# Patient Record
Sex: FEMALE | Race: WHITE | Employment: OTHER | URBAN - METROPOLITAN AREA
[De-identification: names, ages, dates, MRNs, and addresses within clinical notes are randomized per-mention and may not be internally consistent; named-entity substitution may affect disease eponyms.]

---

## 2017-10-14 ENCOUNTER — OFFICE VISIT (OUTPATIENT)
Dept: URGENT CARE | Facility: RETAIL CLINIC | Age: 32
End: 2017-10-14

## 2017-10-14 VITALS — DIASTOLIC BLOOD PRESSURE: 76 MMHG | SYSTOLIC BLOOD PRESSURE: 115 MMHG | TEMPERATURE: 98.1 F | HEART RATE: 80 BPM

## 2017-10-14 DIAGNOSIS — R30.0 DYSURIA: Primary | ICD-10-CM

## 2017-10-14 DIAGNOSIS — N39.0 ACUTE LOWER UTI: ICD-10-CM

## 2017-10-14 LAB
APPEARANCE UR: ABNORMAL
BILIRUB UR QL: ABNORMAL
COLOR UR: ABNORMAL
GLUCOSE URINE: ABNORMAL MG/DL
HGB UR QL: ABNORMAL
KETONES UR QL: ABNORMAL MG/DL
LEUKOCYTE ESTERASE URINE: ABNORMAL
NITRITE UR QL STRIP: ABNORMAL
PH UR STRIP: 6.5 PH (ref 5–7)
PROTEIN ALBUMIN URINE: 100 MG/DL
SOURCE: ABNORMAL
SP GR UR STRIP: 1.02 (ref 1–1.03)
UROBILINOGEN UR QL STRIP: 1 EU/DL (ref 0.2–1)

## 2017-10-14 PROCEDURE — 99203 OFFICE O/P NEW LOW 30 MIN: CPT | Performed by: PHYSICIAN ASSISTANT

## 2017-10-14 PROCEDURE — 81002 URINALYSIS NONAUTO W/O SCOPE: CPT | Mod: QW | Performed by: PHYSICIAN ASSISTANT

## 2017-10-14 RX ORDER — NITROFURANTOIN 25; 75 MG/1; MG/1
100 CAPSULE ORAL 2 TIMES DAILY
Qty: 10 CAPSULE | Refills: 0 | Status: SHIPPED | OUTPATIENT
Start: 2017-10-14 | End: 2017-11-22

## 2017-10-14 NOTE — PATIENT INSTRUCTIONS
Take full course of antibiotics.  May use over the counter Azo or Urostat for urinary burning, but do not use before future urine tests.  Limit caffeine and alcohol as these are bladder irritants.  Drink plenty of fluids.  May take tylenol or ibuprofen as needed for discomfort.   If you develop any vomiting, high fevers or severe back pain, these can be signs of a kidney infection and you should be seen in urgent care or in the ER.  If your symptoms continue after finishing antibiotics or begin again shortly after finishing the antibiotics, please see your primary care provider or go to urgent care. Further testing may be required that is not available at [facility].  Please follow up with primary care provider if not improving, worsening or new symptoms or for any adverse reactions to medications.        **Please follow up with your primary care provider for repeat urine test in 1-2 weeks due to blood and protein being your urine today

## 2017-10-14 NOTE — PROGRESS NOTES
Chief Complaint   Patient presents with     Dysuria     pain after urninating x 3-4 days,  and blood in urine this am, no fevers      SUBJECTIVE:  Shyla Barrett is a 32 year old female who  presents today for a possible UTI. Symptoms of dysuria and frequency have been going on for 3 day(s).  Hematuria yes - today.  gradual onset and worsening and moderate.  There is no history of fever, chills, nausea or vomiting.  No history of vaginal discharge. This patient does  have a history of urinary tract infections - last one 4 yrs ago. Patient denies long duration, flank pain and temperature > 101 degrees F. or vaginal discharge. LMP was 3.5 weeks ago.     Past Medical History:   Diagnosis Date     Back pain      No current outpatient prescriptions on file.        Allergies   Allergen Reactions     Bactrim [Sulfamethoxazole W-Trimethoprim] Rash        Social History   Substance Use Topics     Smoking status: Former Smoker     Packs/day: 0.50     Years: 2.00     Smokeless tobacco: Never Used     Alcohol use Yes      Comment: 2 drinks/month       ROS:   CONSTITUTIONAL:NEGATIVE for fever, chills  GI: NEGATIVE for nausea, vomiting, abdominal pain  : dysuria and frequency; hematuria this AM    OBJECTIVE:  /76 (BP Location: Left arm)  Pulse 80  Temp 98.1  F (36.7  C) (Temporal)  LMP 09/18/2017 (Within Days)  Breastfeeding? No  GENERAL APPEARANCE: healthy, alert and no distress  RESP: lungs clear to auscultation - no rales, rhonchi or wheezes  CV: regular rates and rhythm, normal S1 S2, no murmur noted  ABDOMEN:  soft, nontender, bowel sounds normal  BACK: No CVA tenderness  SKIN: no suspicious lesions or rashes    Urine dipstick small leuks, positive nitrites, mod blood, 100 protein  Strongly advised to send out urine culture, but patient declined due to self pay cost. Discussed the risk of this, she still wanted to proceed with not sending out culture.    ASSESSMENT:   (R30.0) Dysuria  (primary encounter  diagnosis)  (N39.0) Acute lower UTI    PLAN:   nitroFURantoin, macrocrystal-monohydrate, (MACROBID) 100 MG capsule  Take full course of antibiotics.  May use over the counter Azo or Urostat for urinary burning, but do not use before future urine tests.  Limit caffeine and alcohol as these are bladder irritants.  Drink plenty of fluids.  May take tylenol or ibuprofen as needed for discomfort.   If you develop any vomiting, high fevers or severe back pain, these can be signs of a kidney infection and you should be seen in urgent care or in the ER.  If your symptoms continue after finishing antibiotics or begin again shortly after finishing the antibiotics, please see your primary care provider or go to urgent care. Further testing may be required that is not available at [facility].  Please follow up with primary care provider if not improving, worsening or new symptoms or for any adverse reactions to medications.    **Please follow up with your primary care provider for repeat urine test in 1-2 weeks due to blood and protein being your urine today     Rochelle Kirk PA-C  [facility]

## 2017-10-14 NOTE — NURSING NOTE
"Chief Complaint   Patient presents with     Dysuria     pain after urninating x 3-4 days,  and blood in urine this am, no fevers       Initial /76 (BP Location: Left arm)  Pulse 80  Temp 98.1  F (36.7  C) (Temporal)  LMP 09/18/2017 (Within Days)  Breastfeeding? No Estimated body mass index is 20.43 kg/(m^2) as calculated from the following:    Height as of 7/3/14: 5' 1.81\" (1.57 m).    Weight as of 7/3/14: 111 lb (50.3 kg).  Medication Reconciliation: complete    "

## 2017-10-14 NOTE — MR AVS SNAPSHOT
"              After Visit Summary   10/14/2017    Shyla Barrett    MRN: [id number]           Patient Information     Date Of Birth          1985        Visit Information        Provider Department      10/14/2017 11:40 AM Rochelle Kirk PA-C [facility]        Today's Diagnoses     Dysuria    -  1    Acute lower UTI          Care Instructions    Take full course of antibiotics.  May use over the counter Azo or Urostat for urinary burning, but do not use before future urine tests.  Limit caffeine and alcohol as these are bladder irritants.  Drink plenty of fluids.  May take tylenol or ibuprofen as needed for discomfort.   If you develop any vomiting, high fevers or severe back pain, these can be signs of a kidney infection and you should be seen in urgent care or in the ER.  If your symptoms continue after finishing antibiotics or begin again shortly after finishing the antibiotics, please see your primary care provider or go to urgent care. Further testing may be required that is not available at [facility].  Please follow up with primary care provider if not improving, worsening or new symptoms or for any adverse reactions to medications.        **Please follow up with your primary care provider for repeat urine test in 1-2 weeks due to blood and protein being your urine today                 Follow-ups after your visit        Who to contact     You can reach your care team any time of the day by calling [phone].  Notification of test results:  If you have an abnormal lab result, we will notify you by phone as soon as possible.         Additional Information About Your Visit        MyChart Information     [employer] lets you send messages to your doctor, view your test results, renew your prescriptions, schedule appointments and more. To sign up, go to [email or web address] . Click on \"Log in\" on the left side of the screen, which will take you to the Welcome page. Then click " "on \"Sign up Now\" on the right side of the page.     You will be asked to enter the access code listed below, as well as some personal information. Please follow the directions to create your username and password.     Your access code is: -[email or web address]  Expires: 2018 12:11 PM     Your access code will  in 90 days. If you need help or a new code, please call your [place] clinic or [phone].        Care EveryWhere ID     This is your Care EveryWhere ID. This could be used by other organizations to access your [place] medical records  [id number]        Your Vitals Were     Pulse Temperature Last Period Breastfeeding?          80 98.1  F (36.7  C) (Temporal) 2017 (Within Days) No         Blood Pressure from Last 3 Encounters:   10/14/17 115/76   14 (!) 88/62   13 100/62    Weight from Last 3 Encounters:   14 111 lb (50.3 kg)   13 118 lb (53.5 kg)   13 117 lb (53.1 kg)              We Performed the Following     HCL U/A, W/O MICRO, NON AUTO          Today's Medication Changes          These changes are accurate as of: 10/14/17 12:11 PM.  If you have any questions, ask your nurse or doctor.               Start taking these medicines.        Dose/Directions    nitroFURantoin (macrocrystal-monohydrate) 100 MG capsule   Commonly known as:  MACROBID   Used for:  Acute lower UTI        Dose:  100 mg   Take 1 capsule (100 mg) by mouth 2 times daily for 5 days   Quantity:  10 capsule   Refills:  0            Where to get your medicines      These medications were sent to [facility] - [place], [place] - [address] [address]  [address] [address] [address]     Phone:  [phone]     nitroFURantoin (macrocrystal-monohydrate) 100 MG capsule                Primary Care Provider Office Phone # Fax #    GIOVANI Wallace [phone]       XXX RESIGNED XXX [address] [facility] [address]        Equal Access to Services     BECKY KWONG " AH: Mike burdick Farzaneh, waavada luqadaha, qaybta kathu etiennecarmenliliadaly stacey acmeronramon villatoroportialynette darnell. So [facility] [phone].    ATENCIÓN: Si habla español, tiene a weinstein disposición servicios gratuitos de asistencia lingüística. Llame al [phone].    We comply with applicable federal civil rights laws and [place] laws. We do not discriminate on the basis of race, color, national origin, age, disability, sex, sexual orientation, or gender identity.            Thank you!     Thank you for choosing [facility]  for your care. Our goal is always to provide you with excellent care. Hearing back from our patients is one way we can continue to improve our services. Please take a few minutes to complete the written survey that you may receive in the mail after your visit with us. Thank you!             Your Updated Medication List - Protect others around you: Learn how to safely use, store and throw away your medicines at www.disposemymeds.org.          This list is accurate as of: 10/14/17 12:11 PM.  Always use your most recent med list.                   Brand Name Dispense Instructions for use Diagnosis    nitroFURantoin (macrocrystal-monohydrate) 100 MG capsule    MACROBID    10 capsule    Take 1 capsule (100 mg) by mouth 2 times daily for 5 days    Acute lower UTI

## 2017-11-22 ENCOUNTER — OFFICE VISIT (OUTPATIENT)
Dept: FAMILY MEDICINE | Facility: OTHER | Age: 32
End: 2017-11-22
Payer: MEDICAID

## 2017-11-22 VITALS
DIASTOLIC BLOOD PRESSURE: 76 MMHG | WEIGHT: 118 LBS | HEIGHT: 62 IN | SYSTOLIC BLOOD PRESSURE: 108 MMHG | RESPIRATION RATE: 16 BRPM | TEMPERATURE: 98.9 F | HEART RATE: 92 BPM | BODY MASS INDEX: 21.71 KG/M2

## 2017-11-22 DIAGNOSIS — N39.0 ACUTE LOWER UTI: Primary | ICD-10-CM

## 2017-11-22 DIAGNOSIS — R82.90 NONSPECIFIC FINDING ON EXAMINATION OF URINE: ICD-10-CM

## 2017-11-22 DIAGNOSIS — R30.0 DYSURIA: ICD-10-CM

## 2017-11-22 LAB
ALBUMIN UR-MCNC: NEGATIVE MG/DL
APPEARANCE UR: CLEAR
BACTERIA #/AREA URNS HPF: ABNORMAL /HPF
BILIRUB UR QL STRIP: NEGATIVE
COLOR UR AUTO: YELLOW
GLUCOSE UR STRIP-MCNC: NEGATIVE MG/DL
HGB UR QL STRIP: ABNORMAL
KETONES UR STRIP-MCNC: NEGATIVE MG/DL
LEUKOCYTE ESTERASE UR QL STRIP: NEGATIVE
NITRATE UR QL: POSITIVE
NON-SQ EPI CELLS #/AREA URNS LPF: ABNORMAL /LPF
PH UR STRIP: 7 PH (ref 5–7)
RBC #/AREA URNS AUTO: ABNORMAL /HPF
SOURCE: ABNORMAL
SP GR UR STRIP: 1.02 (ref 1–1.03)
SPECIMEN SOURCE: NORMAL
UROBILINOGEN UR STRIP-ACNC: 0.2 EU/DL (ref 0.2–1)
WBC #/AREA URNS AUTO: ABNORMAL /HPF
WET PREP SPEC: NORMAL

## 2017-11-22 PROCEDURE — 81001 URINALYSIS AUTO W/SCOPE: CPT | Performed by: FAMILY MEDICINE

## 2017-11-22 PROCEDURE — 87086 URINE CULTURE/COLONY COUNT: CPT | Performed by: FAMILY MEDICINE

## 2017-11-22 PROCEDURE — 87210 SMEAR WET MOUNT SALINE/INK: CPT | Performed by: FAMILY MEDICINE

## 2017-11-22 PROCEDURE — 99214 OFFICE O/P EST MOD 30 MIN: CPT | Performed by: FAMILY MEDICINE

## 2017-11-22 RX ORDER — NITROFURANTOIN 25; 75 MG/1; MG/1
100 CAPSULE ORAL 2 TIMES DAILY
Qty: 10 CAPSULE | Refills: 0 | Status: SHIPPED | OUTPATIENT
Start: 2017-11-22 | End: 2018-01-29

## 2017-11-22 ASSESSMENT — PAIN SCALES - GENERAL: PAINLEVEL: MILD PAIN (3)

## 2017-11-22 NOTE — NURSING NOTE
"Chief Complaint   Patient presents with     UTI     Panel Management     lipids, pap, flu shot, tdap       Initial /76 (BP Location: Right arm, Patient Position: Chair, Cuff Size: Adult Regular)  Pulse 92  Temp 98.9  F (37.2  C) (Temporal)  Resp 16  Ht 5' 2\" (1.575 m)  Wt 118 lb (53.5 kg)  LMP 10/25/2017 (Approximate)  BMI 21.58 kg/m2 Estimated body mass index is 21.58 kg/(m^2) as calculated from the following:    Height as of this encounter: 5' 2\" (1.575 m).    Weight as of this encounter: 118 lb (53.5 kg).  Medication Reconciliation: complete  Raji Quiroz, [employer]    "

## 2017-11-22 NOTE — PROGRESS NOTES
"  SUBJECTIVE:                                                    Shyla Barrett is a 32 year old female who presents to clinic today for the following health issues:      HPI    URINARY TRACT SYMPTOMS  Onset: week    Description:   Painful urination (Dysuria): YES- slightly  Blood in urine (Hematuria): no   Delay in urine (Hesitency): YES- sometimes    Intensity: mild    Progression of Symptoms:  same    Accompanying Signs & Symptoms:  Fever/chills: no   Flank pain no   Nausea and vomiting: no   Any vaginal symptoms: vaginal discharge  Abdominal/Pelvic Pain: no     History:   History of frequent UTI's: YES  History of kidney stones: no   Sexually Active: YES  Possibility of pregnancy: Don't Know    Precipitating factors:   none    Therapies Tried and outcome: none    Pt has had about 1 week of UTI symptoms.  Just got over a UTI a few weeks ago.  Used to get frequent UTIs.  Her last UTI was diagnosed at [facility].  No culture performed.  Treated with Macrobid for 5 days.      Recently found out that her  has been sleeping with someone else.        Problem list and histories reviewed & adjusted, as indicated.  Additional history: as documented      No current outpatient prescriptions on file.     No lab results found.   BP Readings from Last 3 Encounters:   11/22/17 108/76   10/14/17 115/76   07/03/14 (!) 88/62    Wt Readings from Last 3 Encounters:   11/22/17 118 lb (53.5 kg)   07/03/14 111 lb (50.3 kg)   12/06/13 118 lb (53.5 kg)                 ROS:  Constitutional, HEENT, cardiovascular, pulmonary, gi and gu systems are negative, except as otherwise noted.  See above.        OBJECTIVE:   /76 (BP Location: Right arm, Patient Position: Chair, Cuff Size: Adult Regular)  Pulse 92  Temp 98.9  F (37.2  C) (Temporal)  Resp 16  Ht 5' 2\" (1.575 m)  Wt 118 lb (53.5 kg)  LMP 10/25/2017 (Approximate)  BMI 21.58 kg/m2  Body mass index is 21.58 kg/(m^2).  GENERAL: healthy, alert and no distress  NECK: no " adenopathy, no asymmetry, masses, or scars and thyroid normal to palpation  RESP: lungs clear to auscultation - no rales, rhonchi or wheezes  CV: regular rate and rhythm, normal S1 S2, no S3 or S4, no murmur, click or rub, no peripheral edema and peripheral pulses strong  ABDOMEN: soft, nontender, no hepatosplenomegaly, no masses and bowel sounds normal  MS: no gross musculoskeletal defects noted, no edema    Diagnostic Test Results:  Results for orders placed or performed in visit on 11/22/17   UA reflex to Microscopic and Culture   Result Value Ref Range    Color Urine Yellow     Appearance Urine Clear     Glucose Urine Negative NEG^Negative mg/dL    Bilirubin Urine Negative NEG^Negative    Ketones Urine Negative NEG^Negative mg/dL    Specific Gravity Urine 1.020 1.003 - 1.035    Blood Urine Trace (A) NEG^Negative    pH Urine 7.0 5.0 - 7.0 pH    Protein Albumin Urine Negative NEG^Negative mg/dL    Urobilinogen Urine 0.2 0.2 - 1.0 EU/dL    Nitrite Urine Positive (A) NEG^Negative    Leukocyte Esterase Urine Negative NEG^Negative    Source Unspecified Urine    Urine Microscopic   Result Value Ref Range    WBC Urine O - 2 OTO2^O - 2 /HPF    RBC Urine O - 2 OTO2^O - 2 /HPF    Squamous Epithelial /LPF Urine Many (A) FEW^Few /LPF    Bacteria Urine Few (A) NEG^Negative /HPF   Wet prep   Result Value Ref Range    Specimen Description Vagina     Wet Prep No Trichomonas seen     Wet Prep No clue cells seen     Wet Prep No yeast seen        ASSESSMENT/PLAN:       ICD-10-CM    1. Acute lower UTI N39.0 nitroFURantoin, macrocrystal-monohydrate, (MACROBID) 100 MG capsule     Urine Microscopic   2. Dysuria R30.0 UA reflex to Microscopic and Culture     Wet prep   3. Nonspecific finding on examination of urine R82.90 Urine Culture Aerobic Bacterial     Appears c/w UTI given positive nitrite.  Will treat with antibiotics and await culture results.  If symptoms aren't improving or culture doesn't indicate infection, would recommend  testing for STDs given her 's recent cheating.  Would offer counseling referral as well.                Patient Instructions   Thank you for visiting [facility]    Take the antibiotics until they're gone.    We'll let you know culture results ASAP.    Contact us or return if questions or concerns.     Please make an appointment with your either myself or your provider  in 1-2 weeks for follow up if needed.       If you had imaging scheduled please refer to your radiology prep sheet.    Appointment    Date_______________     Time_____________    Day:   M TU W TH F    With____________________________    Location_________________________    If you need medication refills, please contact your pharmacy 3 days before your prescriptions runs out. If you are out of refills, your pharmacy will contact contact the clinic.    Contact us or return if questions or concerns.     -Your Care Team:  MD Debroah Mcclelland PA-C Joel De Haan, PA-C Elizabeth McLean, APRN CNP    General information about your clinic      Clinic hours:     Lab hours:  Phone [phone]  Monday 7:30 am-7 pm    Monday 8:30 am-6:30 pm  Tuesday-Friday 7:30 am-5 pm   Tuesday-Friday 8:30 am-4:30 pm    Pharmacy hours:  Phone [phone]  Monday 8:30 am-7pm  Tuesday-Friday 8:30am-6 pm                                       Mychart assistance [phone]        We would like to hear from you, how was your visit today?    Courtney Phan  Patient Information Supervisor   Patient Care Supervisor  [facility], and [facility], and [facility]  [phone]         Luis Eduardo Johnson MD, MD  [facility]

## 2017-11-22 NOTE — MR AVS SNAPSHOT
After Visit Summary   11/22/2017    Shyla Barrett    MRN: [id number]           Patient Information     Date Of Birth          1985        Visit Information        Provider Department      11/22/2017 8:30 AM Luis Eduardo Johnson MD [facility]        Today's Diagnoses     Acute lower UTI    -  1    Dysuria          Care Instructions    Thank you for visiting [facility]    Take the antibiotics until they're gone.    We'll let you know culture results ASAP.    Contact us or return if questions or concerns.     Please make an appointment with your either myself or your provider  in 1-2 weeks for follow up if needed.       If you had imaging scheduled please refer to your radiology prep sheet.    Appointment    Date_______________     Time_____________    Day:   M TU W TH F    With____________________________    Location_________________________    If you need medication refills, please contact your pharmacy 3 days before your prescriptions runs out. If you are out of refills, your pharmacy will contact contact the clinic.    Contact us or return if questions or concerns.     -Your Care Team:  MD Deborah Mcclelland PA-C Joel De Haan, PA-C Elizabeth McLean, APRN CNP    General information about your clinic      Clinic hours:     Lab hours:  Phone [phone]  Monday 7:30 am-7 pm    Monday 8:30 am-6:30 pm  Tuesday-Friday 7:30 am-5 pm   Tuesday-Friday 8:30 am-4:30 pm    Pharmacy hours:  Phone [phone]  Monday 8:30 am-7pm  Tuesday-Friday 8:30am-6 pm                                       Mychart assistance [phone]        We would like to hear from you, how was your visit today?    Courtney Phan  Patient Information Supervisor   Patient Care Supervisor  [facility], and \[facility] [place] [facility]\"", and [facility]  [phone]             Follow-ups after your visit        Who to  "contact     If you have questions or need follow up information about today's clinic visit or your schedule please contact [facility] directly at [phone].  Normal or non-critical lab and imaging results will be communicated to you by MyChart, letter or phone within 4 business days after the clinic has received the results. If you do not hear from us within 7 days, please contact the clinic through [employer] or phone. If you have a critical or abnormal lab result, we will notify you by phone as soon as possible.  Submit refill requests through [email or web address] or call your pharmacy and they will forward the refill request to us. Please allow 3 business days for your refill to be completed.          Additional Information About Your Visit        [email or web address] Information     [email or web address] lets you send messages to your doctor, view your test results, renew your prescriptions, schedule appointments and more. To sign up, go to [email or web address] . Click on \"Log in\" on the left side of the screen, which will take you to the Welcome page. Then click on \"Sign up Now\" on the right side of the page.     You will be asked to enter the access code listed below, as well as some personal information. Please follow the directions to create your username and password.     Your access code is: -[email or web address]  Expires: 2018 11:11 AM     Your access code will  in 90 days. If you need help or a new code, please call your [place] clinic or [phone].        Care EveryWhere ID     This is your Care EveryWhere ID. This could be used by other organizations to access your [place] medical records  [id number]        Your Vitals Were     Pulse Temperature Respirations Height Last Period BMI (Body Mass Index)    92 98.9  F (37.2  C) (Temporal) 16 5' 2\" (1.575 m) 10/25/2017 (Approximate) 21.58 kg/m2       Blood Pressure from Last 3 Encounters:   17 108/76   10/14/17 115/76   14 (!) 88/62    Weight from Last 3 " Encounters:   11/22/17 118 lb (53.5 kg)   07/03/14 111 lb (50.3 kg)   12/06/13 118 lb (53.5 kg)              We Performed the Following     UA reflex to Microscopic and Culture     Urine Microscopic     Wet prep          Today's Medication Changes          These changes are accurate as of: 11/22/17  9:03 AM.  If you have any questions, ask your nurse or doctor.               Start taking these medicines.        Dose/Directions    nitroFURantoin (macrocrystal-monohydrate) 100 MG capsule   Commonly known as:  MACROBID   Used for:  Acute lower UTI   Started by:  Luis Eduardo Johnson MD        Dose:  100 mg   Take 1 capsule (100 mg) by mouth 2 times daily   Quantity:  10 capsule   Refills:  0            Where to get your medicines      These medications were sent to [facility] [place] - [address]   [address] [place] [address]     Phone:  [phone]     nitroFURantoin (macrocrystal-monohydrate) 100 MG capsule                Primary Care Provider Office Phone # Fax #    Flaca Leggett Marie, APRN [phone]       XXX RESIGNED XXX [address]  [facility] [address]        Equal Access to Services     BECKY KWONG AH: Hadii saman torrez hadasho Soomaali, waaxda luqadaha, qaybta kaalmada adeegyada, alf ibarra haynida darnell. So [facility] [phone].    ATENCIÓN: Si habla español, tiene a weinstein disposición servicios gratuitos de asistencia lingüística. Llame al [phone].    We comply with applicable federal civil rights laws and [place] laws. We do not discriminate on the basis of race, color, national origin, age, disability, sex, sexual orientation, or gender identity.            Thank you!     Thank you for choosing [facility]  for your care. Our goal is always to provide you with excellent care. Hearing back from our patients is one way we can continue to improve our services. Please take a few minutes to complete the written  survey that you may receive in the mail after your visit with us. Thank you!             Your Updated Medication List - Protect others around you: Learn how to safely use, store and throw away your medicines at www.disposemymeds.org.          This list is accurate as of: 11/22/17  9:03 AM.  Always use your most recent med list.                   Brand Name Dispense Instructions for use Diagnosis    nitroFURantoin (macrocrystal-monohydrate) 100 MG capsule    MACROBID    10 capsule    Take 1 capsule (100 mg) by mouth 2 times daily    Acute lower UTI

## 2017-11-22 NOTE — PATIENT INSTRUCTIONS
Thank you for visiting [facility]    Take the antibiotics until they're gone.    We'll let you know culture results ASAP.    Contact us or return if questions or concerns.     Please make an appointment with your either myself or your provider  in 1-2 weeks for follow up if needed.       If you had imaging scheduled please refer to your radiology prep sheet.    Appointment    Date_______________     Time_____________    Day:   M TU W TH F    With____________________________    Location_________________________    If you need medication refills, please contact your pharmacy 3 days before your prescriptions runs out. If you are out of refills, your pharmacy will contact contact the clinic.    Contact us or return if questions or concerns.     -Your Care Team:  MD Deborah Mcclelland PA-C Joel De Haan, PA-C Elizabeth McLean, APRN CNP    General information about your clinic      Clinic hours:     Lab hours:  Phone [phone]  Monday 7:30 am-7 pm    Monday 8:30 am-6:30 pm  Tuesday-Friday 7:30 am-5 pm   Tuesday-Friday 8:30 am-4:30 pm    Pharmacy hours:  Phone [phone]  Monday 8:30 am-7pm  Tuesday-Friday 8:30am-6 pm                                       Mychart assistance [phone]        We would like to hear from you, how was your visit today?    Courtney Phan  Patient Information Supervisor   Patient Care Supervisor  [facility], and [facility], and [facility]  [phone]

## 2017-11-23 LAB
BACTERIA SPEC CULT: NORMAL
SPECIMEN SOURCE: NORMAL

## 2017-11-24 NOTE — PROGRESS NOTES
Your urine culture didn't indicate infection.  If your symptoms aren't resolving after your antibiotics, then I'd recommend we do testing for other vaginal infections (genprobe).    Thanks,    Dr. Johnson

## 2018-01-26 NOTE — PROGRESS NOTES
SUBJECTIVE:   Shyla Barrett is a 32 year old female who presents to clinic today for the following health issues:      HPI    Patient here today for STD screening.  Patient stated she found our her  was sleeping with someone else.  Patient states she does not have any symptoms currently, but was having some irregular bleeding.  She also reports increased spotting over the past 2 months.  She was previously diagnosed with hypothyroidism and has been out of medications for over a year due to lack of insurance.    Problem list and histories reviewed & adjusted, as indicated.  Additional history: as documented        Patient Active Problem List   Diagnosis     Low back pain     Ankle pain     Headache     Dysmenorrhea     Acquired hypothyroidism     Screen for STD (sexually transmitted disease)     Past Surgical History:   Procedure Laterality Date     ABDOMEN SURGERY       BREAST SURGERY       C/SECTION, CLASSICAL      x 2     COSMETIC SURGERY         Social History   Substance Use Topics     Smoking status: Current Some Day Smoker     Packs/day: 0.50     Years: 2.00     Smokeless tobacco: Never Used     Alcohol use Yes      Comment: 2 drinks/month     Family History   Problem Relation Age of Onset     Cancer - colorectal Maternal Grandfather          No current outpatient prescriptions on file.     Allergies   Allergen Reactions     Bactrim [Sulfamethoxazole W-Trimethoprim] Rash     BP Readings from Last 3 Encounters:   01/29/18 104/66   11/22/17 108/76   10/14/17 115/76    Wt Readings from Last 3 Encounters:   01/29/18 117 lb (53.1 kg)   11/22/17 118 lb (53.5 kg)   07/03/14 111 lb (50.3 kg)                  Labs reviewed in EPIC    ROS:  Constitutional, HEENT, cardiovascular, pulmonary, GI, , musculoskeletal, neuro, skin, endocrine and psych systems are negative, except as otherwise noted.    OBJECTIVE:     /66 (BP Location: Right arm, Patient Position: Chair, Cuff Size: Adult Regular)  Pulse 98   "Temp 97.9  F (36.6  C) (Oral)  Resp 16  Ht 5' 2\" (1.575 m)  Wt 117 lb (53.1 kg)  SpO2 100%  BMI 21.4 kg/m2  Body mass index is 21.4 kg/(m^2).   Physical Exam   Constitutional: She is oriented to person, place, and time. She appears well-developed and well-nourished.   HENT:   Head: Normocephalic and atraumatic.   Right Ear: External ear normal.   Left Ear: External ear normal.   Mouth/Throat: Oropharynx is clear and moist.   Eyes: EOM are normal.   Neck: Neck supple.   Cardiovascular: Normal rate, regular rhythm, normal heart sounds and intact distal pulses.    Pulmonary/Chest: Effort normal and breath sounds normal. No respiratory distress. She has no wheezes. She has no rales. She exhibits no tenderness.   Abdominal: Soft. Bowel sounds are normal.   Genitourinary:   Genitourinary Comments: She declined vaginal exam today.   Neurological: She is alert and oriented to person, place, and time.   Psychiatric: She has a normal mood and affect. Her behavior is normal. Judgment and thought content normal.         Diagnostic Test Results:  none     ASSESSMENT/PLAN:     Problem List Items Addressed This Visit        SPECIALTY PROBLEM LIST    Dysmenorrhea - Primary     Rule out pregnancy.  Likely secondary to not being on her thyroid medications.  Get TSH levels and complete blood picture.  Will restart medications based on results.           Relevant Orders    TSH with free T4 reflex (Completed)    CBC with platelets (Completed)       Other    Screen for STD (sexually transmitted disease)     Patient found out that her  was sleeping with someone else and [facility] would like to have STD screening donee   Denies any current symptoms.  Labs as ordered.  Follow results and treat accordingly.         Relevant Orders    NEISSERIA GONORRHOEA PCR (Completed)    CHLAMYDIA TRACHOMATIS PCR (Completed)    Wet prep (Completed)    *UA reflex to Microscopic (Completed)    HIV Antigen Antibody Combo (Completed)    Urine " Microscopic (Completed)         Lluvia Cardona MD  [facility]

## 2018-01-29 ENCOUNTER — OFFICE VISIT (OUTPATIENT)
Dept: FAMILY MEDICINE | Facility: OTHER | Age: 33
End: 2018-01-29
Payer: MEDICAID

## 2018-01-29 VITALS
SYSTOLIC BLOOD PRESSURE: 104 MMHG | RESPIRATION RATE: 16 BRPM | BODY MASS INDEX: 21.53 KG/M2 | DIASTOLIC BLOOD PRESSURE: 66 MMHG | TEMPERATURE: 97.9 F | HEIGHT: 62 IN | OXYGEN SATURATION: 100 % | HEART RATE: 98 BPM | WEIGHT: 117 LBS

## 2018-01-29 DIAGNOSIS — N94.6 DYSMENORRHEA: Primary | ICD-10-CM

## 2018-01-29 DIAGNOSIS — Z11.3 SCREEN FOR STD (SEXUALLY TRANSMITTED DISEASE): ICD-10-CM

## 2018-01-29 PROBLEM — E03.9 ACQUIRED HYPOTHYROIDISM: Status: ACTIVE | Noted: 2018-01-29

## 2018-01-29 LAB
ALBUMIN UR-MCNC: NEGATIVE MG/DL
APPEARANCE UR: ABNORMAL
BACTERIA #/AREA URNS HPF: ABNORMAL /HPF
BILIRUB UR QL STRIP: NEGATIVE
CHOLEST SERPL-MCNC: 134 MG/DL
COLOR UR AUTO: ABNORMAL
ERYTHROCYTE [DISTWIDTH] IN BLOOD BY AUTOMATED COUNT: 12.4 % (ref 10–15)
GLUCOSE UR STRIP-MCNC: NEGATIVE MG/DL
HCT VFR BLD AUTO: 41 % (ref 35–47)
HDLC SERPL-MCNC: 56 MG/DL
HGB BLD-MCNC: 14.2 G/DL (ref 11.7–15.7)
HGB UR QL STRIP: ABNORMAL
KETONES UR STRIP-MCNC: NEGATIVE MG/DL
LDLC SERPL CALC-MCNC: 34 MG/DL
LEUKOCYTE ESTERASE UR QL STRIP: NEGATIVE
MCH RBC QN AUTO: 34.8 PG (ref 26.5–33)
MCHC RBC AUTO-ENTMCNC: 34.6 G/DL (ref 31.5–36.5)
MCV RBC AUTO: 101 FL (ref 78–100)
MUCOUS THREADS #/AREA URNS LPF: PRESENT /LPF
NITRATE UR QL: NEGATIVE
NON-SQ EPI CELLS #/AREA URNS LPF: ABNORMAL /LPF
NONHDLC SERPL-MCNC: 78 MG/DL
PH UR STRIP: 6.5 PH (ref 5–7)
PLATELET # BLD AUTO: 297 10E9/L (ref 150–450)
RBC # BLD AUTO: 4.08 10E12/L (ref 3.8–5.2)
RBC #/AREA URNS AUTO: ABNORMAL /HPF
SOURCE: ABNORMAL
SP GR UR STRIP: 1.02 (ref 1–1.03)
SPECIMEN SOURCE: ABNORMAL
T4 FREE SERPL-MCNC: 0.7 NG/DL (ref 0.76–1.46)
TRIGL SERPL-MCNC: 220 MG/DL
TSH SERPL DL<=0.005 MIU/L-ACNC: 27.16 MU/L (ref 0.4–4)
UROBILINOGEN UR STRIP-ACNC: 0.2 EU/DL (ref 0.2–1)
WBC # BLD AUTO: 6.1 10E9/L (ref 4–11)
WBC #/AREA URNS AUTO: ABNORMAL /HPF
WET PREP SPEC: ABNORMAL

## 2018-01-29 PROCEDURE — 87591 N.GONORRHOEAE DNA AMP PROB: CPT | Performed by: FAMILY MEDICINE

## 2018-01-29 PROCEDURE — 81001 URINALYSIS AUTO W/SCOPE: CPT | Performed by: FAMILY MEDICINE

## 2018-01-29 PROCEDURE — 87389 HIV-1 AG W/HIV-1&-2 AB AG IA: CPT | Performed by: FAMILY MEDICINE

## 2018-01-29 PROCEDURE — 84443 ASSAY THYROID STIM HORMONE: CPT | Performed by: FAMILY MEDICINE

## 2018-01-29 PROCEDURE — 99214 OFFICE O/P EST MOD 30 MIN: CPT | Performed by: FAMILY MEDICINE

## 2018-01-29 PROCEDURE — 36415 COLL VENOUS BLD VENIPUNCTURE: CPT | Performed by: FAMILY MEDICINE

## 2018-01-29 PROCEDURE — 84439 ASSAY OF FREE THYROXINE: CPT | Performed by: FAMILY MEDICINE

## 2018-01-29 PROCEDURE — 87491 CHLMYD TRACH DNA AMP PROBE: CPT | Performed by: FAMILY MEDICINE

## 2018-01-29 PROCEDURE — 80061 LIPID PANEL: CPT | Performed by: FAMILY MEDICINE

## 2018-01-29 PROCEDURE — 87210 SMEAR WET MOUNT SALINE/INK: CPT | Performed by: FAMILY MEDICINE

## 2018-01-29 PROCEDURE — 85027 COMPLETE CBC AUTOMATED: CPT | Performed by: FAMILY MEDICINE

## 2018-01-29 ASSESSMENT — PAIN SCALES - GENERAL: PAINLEVEL: NO PAIN (0)

## 2018-01-29 NOTE — ASSESSMENT & PLAN NOTE
Patient found out that her  was sleeping with someone else and [facility] would like to have STD screening donee   Denies any current symptoms.  Labs as ordered.  Follow results and treat accordingly.

## 2018-01-29 NOTE — MR AVS SNAPSHOT
"              After Visit Summary   1/29/2018    Shyla Barrett    MRN: [id number]           Patient Information     Date Of Birth          1985        Visit Information        Provider Department      1/29/2018 11:20 AM Lluvia Cardona MD [facility]        Today's Diagnoses     Screen for STD (sexually transmitted disease)    -  1    Need for prophylactic vaccination and inoculation against influenza        Need for prophylactic vaccination with tetanus-diphtheria (TD)        Dysmenorrhea           Follow-ups after your visit        Follow-up notes from your care team     Return if symptoms worsen or fail to improve.      Who to contact     If you have questions or need follow up information about today's clinic visit or your schedule please contact [facility] directly at [phone].  Normal or non-critical lab and imaging results will be communicated to you by Wi3hart, letter or phone within 4 business days after the clinic has received the results. If you do not hear from us within 7 days, please contact the clinic through MyChart or phone. If you have a critical or abnormal lab result, we will notify you by phone as soon as possible.  Submit refill requests through Similar Pages or call your pharmacy and they will forward the refill request to us. Please allow 3 business days for your refill to be completed.          Additional Information About Your Visit        MyCharClipik Information     Similar Pages lets you send messages to your doctor, view your test results, renew your prescriptions, schedule appointments and more. To sign up, go to [email or web address] . Click on \"Log in\" on the left side of the screen, which will take you to the Welcome page. Then click on \"Sign up Now\" on the right side of the page.     You will be asked to enter the access code listed below, as well as some personal information. Please follow the directions to create your username and password.     Your " "access code is: [email or web address]  Expires: 2018 11:43 AM     Your access code will  in 90 days. If you need help or a new code, please call your [place] clinic or [phone].        Care EveryWhere ID     This is your Care EveryWhere ID. This could be used by other organizations to access your [place] medical records  [id number]        Your Vitals Were     Pulse Temperature Respirations Height Pulse Oximetry BMI (Body Mass Index)    98 97.9  F (36.6  C) (Oral) 16 5' 2\" (1.575 m) 100% 21.4 kg/m2       Blood Pressure from Last 3 Encounters:   18 104/66   17 108/76   10/14/17 115/76    Weight from Last 3 Encounters:   18 117 lb (53.1 kg)   17 118 lb (53.5 kg)   14 111 lb (50.3 kg)              We Performed the Following     *UA reflex to Microscopic     CBC with platelets     CHLAMYDIA TRACHOMATIS PCR     HIV Antigen Antibody Combo     Lipid panel reflex to direct LDL Fasting     NEISSERIA GONORRHOEA PCR     TSH with free T4 reflex     Wet prep        Primary Care Provider Office Phone # Fax #    GIOVANI Wallace [phone]       XXX RESIGNED XXX [address] [facility] [address]        Equal Access to Services     BECKY KWONG AH: Hadii saman sahuo Somariya, waaxda luqadaha, qaybta kaalmada brittany, alf darnell. So [facility] [phone].    ATENCIÓN: Si habla español, tiene a weinstein disposición servicios gratuitos de asistencia lingüística. Llame al [phone].    We comply with applicable federal civil rights laws and [place] laws. We do not discriminate on the basis of race, color, national origin, age, disability, sex, sexual orientation, or gender identity.            Thank you!     Thank you for choosing [facility]  for your care. Our goal is always to provide you with excellent care. Hearing back from our patients is one way we can continue to improve our services. Please take a few minutes " to complete the written survey that you may receive in the mail after your visit with us. Thank you!             Your Updated Medication List - Protect others around you: Learn how to safely use, store and throw away your medicines at www.disposemymeds.org.      Notice  As of 1/29/2018 11:43 AM    You have not been prescribed any medications.

## 2018-01-29 NOTE — ASSESSMENT & PLAN NOTE
Rule out pregnancy.  Likely secondary to not being on her thyroid medications.  Get TSH levels and complete blood picture.  Will restart medications based on results.

## 2018-01-29 NOTE — NURSING NOTE
"Chief Complaint   Patient presents with     STD     screening     Panel Management     mychart, tdap, flu, lipid screening        Initial /66 (BP Location: Right arm, Patient Position: Chair, Cuff Size: Adult Regular)  Pulse 98  Temp 97.9  F (36.6  C) (Oral)  Resp 16  Ht 5' 2\" (1.575 m)  Wt 117 lb (53.1 kg)  SpO2 100%  BMI 21.4 kg/m2 Estimated body mass index is 21.4 kg/(m^2) as calculated from the following:    Height as of this encounter: 5' 2\" (1.575 m).    Weight as of this encounter: 117 lb (53.1 kg).  Medication Reconciliation: complete   Rachelle Fleming CMA (AAMA)      "

## 2018-01-30 ENCOUNTER — TELEPHONE (OUTPATIENT)
Dept: FAMILY MEDICINE | Facility: OTHER | Age: 33
End: 2018-01-30

## 2018-01-30 DIAGNOSIS — N76.0 BACTERIAL VAGINOSIS: Primary | ICD-10-CM

## 2018-01-30 DIAGNOSIS — E03.9 HYPOTHYROIDISM, UNSPECIFIED TYPE: ICD-10-CM

## 2018-01-30 DIAGNOSIS — B96.89 BACTERIAL VAGINOSIS: Primary | ICD-10-CM

## 2018-01-30 LAB
C TRACH DNA SPEC QL NAA+PROBE: NEGATIVE
HIV 1+2 AB+HIV1 P24 AG SERPL QL IA: NONREACTIVE
N GONORRHOEA DNA SPEC QL NAA+PROBE: NEGATIVE
SPECIMEN SOURCE: NORMAL
SPECIMEN SOURCE: NORMAL

## 2018-01-30 RX ORDER — LEVOTHYROXINE SODIUM 50 UG/1
50 TABLET ORAL DAILY
Qty: 90 TABLET | Refills: 0 | Status: SHIPPED | OUTPATIENT
Start: 2018-01-30 | End: 2018-03-09

## 2018-01-30 RX ORDER — METRONIDAZOLE 500 MG/1
500 TABLET ORAL 2 TIMES DAILY
Qty: 14 TABLET | Refills: 0 | Status: SHIPPED | OUTPATIENT
Start: 2018-01-30 | End: 2018-03-09

## 2018-01-30 NOTE — TELEPHONE ENCOUNTER
Please inform patient that her thyroid test came back abnormal indicating that she should be on thyroid supplementation.  I sent a prescription for her thyroid medication.  Advised to start taking the medication right away.  This should help with her irregular menstrual periods.  If she does not notice any improvement she should be calling us back within a couple of months.  She needs a recheck on her thyroid in the next 6-8 weeks.  Her vaginal discharge shows signs of overgrowth of normal vaginal bacteria.  I would recommend antibiotics for this.  I sent a prescription for the antibiotics to the pharmacy.  The other STD screening is still pending.  Will get back to her soon as those results are available.

## 2018-03-07 NOTE — PROGRESS NOTES
SUBJECTIVE:   Shyla Barrett is a 32 year old female who presents to clinic today for the following health issues:      HPI  Vaginal Symptoms  Onset: x 1 week    Description:  Vaginal Discharge: creamy   Itching (Pruritis): YES  Burning sensation:  no   Odor: no     Accompanying Signs & Symptoms:  Pain with Urination: no   Abdominal Pain: no   Fever: no     History:   Sexually active: YES  New Partner: YES- 1  Possibility of Pregnancy:  Don't Know    Precipitating factors:   Recent Antibiotic Use: YES- Flagyl  Therapies Tried and outcome: OTC yeast infection kit    Also needs refills on levothyroxine  Problem list and histories reviewed & adjusted, as indicated.  Additional history: as documented        Patient Active Problem List   Diagnosis     Low back pain     Ankle pain     Headache     Dysmenorrhea     Acquired hypothyroidism     Screen for STD (sexually transmitted disease)     Past Surgical History:   Procedure Laterality Date     ABDOMEN SURGERY       BREAST SURGERY       C/SECTION, CLASSICAL      x 2     COSMETIC SURGERY         Social History   Substance Use Topics     Smoking status: Current Some Day Smoker     Packs/day: 0.50     Years: 2.00     Smokeless tobacco: Never Used     Alcohol use Yes      Comment: 2 drinks/month     Family History   Problem Relation Age of Onset     Cancer - colorectal Maternal Grandfather          Current Outpatient Prescriptions   Medication Sig Dispense Refill     levothyroxine (SYNTHROID/LEVOTHROID) 100 MCG tablet Take 1 tablet (100 mcg) by mouth daily 90 tablet 0     [DISCONTINUED] levothyroxine (SYNTHROID/LEVOTHROID) 50 MCG tablet Take 1 tablet (50 mcg) by mouth daily 90 tablet 0     Allergies   Allergen Reactions     Bactrim [Sulfamethoxazole W-Trimethoprim] Rash     BP Readings from Last 3 Encounters:   03/09/18 108/74   01/29/18 104/66   11/22/17 108/76    Wt Readings from Last 3 Encounters:   03/09/18 118 lb (53.5 kg)   01/29/18 117 lb (53.1 kg)   11/22/17 118 lb  "(53.5 kg)                  Labs reviewed in EPIC    ROS:  Constitutional, HEENT, cardiovascular, pulmonary, gi and gu systems are negative, except as otherwise noted.    OBJECTIVE:     /74 (BP Location: Right arm, Patient Position: Chair, Cuff Size: Adult Regular)  Pulse 117  Temp 98  F (36.7  C) (Oral)  Resp 16  Ht 5' 2\" (1.575 m)  Wt 118 lb (53.5 kg)  SpO2 100%  BMI 21.58 kg/m2  Body mass index is 21.58 kg/(m^2).   Physical Exam   Constitutional: She appears well-developed and well-nourished.   HENT:   Head: Normocephalic and atraumatic.   Eyes: EOM are normal.   Neck: No thyromegaly present.   Cardiovascular: Normal rate, regular rhythm, normal heart sounds and intact distal pulses.  Exam reveals no gallop.    No murmur heard.  Pulmonary/Chest: Effort normal and breath sounds normal.   Genitourinary:   Genitourinary Comments: Pt to self swab   Psychiatric: She has a normal mood and affect.         Diagnostic Test Results:  none     ASSESSMENT/PLAN:     Problem List Items Addressed This Visit     Acquired hypothyroidism     Improved symptoms on levothyroxine . She increased dose to 100mcg and feels good on it   Her dysmenorrhea has improved  Continue 100mcg   Recheck in 3 months         Relevant Medications    levothyroxine (SYNTHROID/LEVOTHROID) 100 MCG tablet      Other Visit Diagnoses     Vaginal discharge    -  Primary    Relevant Orders    Wet prep    Hypothyroidism, unspecified type        Relevant Medications    levothyroxine (SYNTHROID/LEVOTHROID) 100 MCG tablet         Will follow wet prep and treat accordingly  Positive for clue cells  Treat with metronidazole    Lluvia Cardona MD  [facility]  "

## 2018-03-09 ENCOUNTER — OFFICE VISIT (OUTPATIENT)
Dept: FAMILY MEDICINE | Facility: OTHER | Age: 33
End: 2018-03-09
Payer: MEDICAID

## 2018-03-09 VITALS
HEART RATE: 117 BPM | HEIGHT: 62 IN | BODY MASS INDEX: 21.71 KG/M2 | TEMPERATURE: 98 F | WEIGHT: 118 LBS | OXYGEN SATURATION: 100 % | DIASTOLIC BLOOD PRESSURE: 74 MMHG | RESPIRATION RATE: 16 BRPM | SYSTOLIC BLOOD PRESSURE: 108 MMHG

## 2018-03-09 DIAGNOSIS — E03.9 ACQUIRED HYPOTHYROIDISM: ICD-10-CM

## 2018-03-09 DIAGNOSIS — B96.89 BACTERIAL VAGINOSIS: ICD-10-CM

## 2018-03-09 DIAGNOSIS — N89.8 VAGINAL DISCHARGE: Primary | ICD-10-CM

## 2018-03-09 DIAGNOSIS — E03.9 HYPOTHYROIDISM, UNSPECIFIED TYPE: ICD-10-CM

## 2018-03-09 DIAGNOSIS — N76.0 BACTERIAL VAGINOSIS: ICD-10-CM

## 2018-03-09 LAB
SPECIMEN SOURCE: ABNORMAL
WET PREP SPEC: ABNORMAL

## 2018-03-09 PROCEDURE — 99214 OFFICE O/P EST MOD 30 MIN: CPT | Performed by: FAMILY MEDICINE

## 2018-03-09 PROCEDURE — 87210 SMEAR WET MOUNT SALINE/INK: CPT | Performed by: FAMILY MEDICINE

## 2018-03-09 RX ORDER — LEVOTHYROXINE SODIUM 100 UG/1
100 TABLET ORAL DAILY
Qty: 90 TABLET | Refills: 0 | Status: SHIPPED | OUTPATIENT
Start: 2018-03-09 | End: 2018-06-22

## 2018-03-09 RX ORDER — METRONIDAZOLE 500 MG/1
500 TABLET ORAL 3 TIMES DAILY
Qty: 21 TABLET | Refills: 0 | Status: SHIPPED | OUTPATIENT
Start: 2018-03-09 | End: 2018-03-16

## 2018-03-09 ASSESSMENT — PAIN SCALES - GENERAL: PAINLEVEL: NO PAIN (0)

## 2018-03-09 NOTE — MR AVS SNAPSHOT
"              After Visit Summary   3/9/2018    Shyla Barrett    MRN: [id number]           Patient Information     Date Of Birth          1985        Visit Information        Provider Department      3/9/2018 2:20 PM Lluvia Cardona MD [facility]        Today's Diagnoses     Vaginal discharge    -  1    Hypothyroidism, unspecified type           Follow-ups after your visit        Follow-up notes from your care team     Return in about 3 months (around 2018).      Who to contact     If you have questions or need follow up information about today's clinic visit or your schedule please contact [facility] directly at [phone].  Normal or non-critical lab and imaging results will be communicated to you by MyChart, letter or phone within 4 business days after the clinic has received the results. If you do not hear from us within 7 days, please contact the clinic through MyChart or phone. If you have a critical or abnormal lab result, we will notify you by phone as soon as possible.  Submit refill requests through TagTagCity or call your pharmacy and they will forward the refill request to us. Please allow 3 business days for your refill to be completed.          Additional Information About Your Visit        MyChart Information     TagTagCity lets you send messages to your doctor, view your test results, renew your prescriptions, schedule appointments and more. To sign up, go to [email or web address] . Click on \"Log in\" on the left side of the screen, which will take you to the Welcome page. Then click on \"Sign up Now\" on the right side of the page.     You will be asked to enter the access code listed below, as well as some personal information. Please follow the directions to create your username and password.     Your access code is: [email or web address]  Expires: 2018 11:43 AM     Your access code will  in 90 days. If you need help or a new code, please call your [place] " "clinic or [phone].        Care EveryWhere ID     This is your Care EveryWhere ID. This could be used by other organizations to access your [place] medical records  [id number]        Your Vitals Were     Pulse Temperature Respirations Height Pulse Oximetry BMI (Body Mass Index)    117 98  F (36.7  C) (Oral) 16 5' 2\" (1.575 m) 100% 21.58 kg/m2       Blood Pressure from Last 3 Encounters:   03/09/18 108/74   01/29/18 104/66   11/22/17 108/76    Weight from Last 3 Encounters:   03/09/18 118 lb (53.5 kg)   01/29/18 117 lb (53.1 kg)   11/22/17 118 lb (53.5 kg)              We Performed the Following     Wet prep          Today's Medication Changes          These changes are accurate as of 3/9/18  2:47 PM.  If you have any questions, ask your nurse or doctor.               These medicines have changed or have updated prescriptions.        Dose/Directions    levothyroxine 100 MCG tablet   Commonly known as:  SYNTHROID/LEVOTHROID   This may have changed:    - medication strength  - how much to take   Used for:  Hypothyroidism, unspecified type   Changed by:  Lluvia Cardona MD        Dose:  100 mcg   Take 1 tablet (100 mcg) by mouth daily   Quantity:  90 tablet   Refills:  0            Where to get your medicines      These medications were sent to [facility] [address] [address]     Phone:  [phone]     levothyroxine 100 MCG tablet                Primary Care Provider Office Phone # Fax #    Flaca Salazar, GIOVANI [phone]       XXX RESIGNED XXX [address] [facility] [address]        Equal Access to Services     YARELI KWONG AH: Mike Moy, franci heller, sinan kaalmabren soto, alf darnell. So [facility] [phone].    ATENCIÓN: Si habla español, tiene a weinstein disposición servicios gratuitos de asistencia lingüística. Llame al [phone].    We comply with applicable " federal civil rights laws and [place] laws. We do not discriminate on the basis of race, color, national origin, age, disability, sex, sexual orientation, or gender identity.            Thank you!     Thank you for choosing [facility]  for your care. Our goal is always to provide you with excellent care. Hearing back from our patients is one way we can continue to improve our services. Please take a few minutes to complete the written survey that you may receive in the mail after your visit with us. Thank you!             Your Updated Medication List - Protect others around you: Learn how to safely use, store and throw away your medicines at www.disposemymeds.org.          This list is accurate as of 3/9/18  2:47 PM.  Always use your most recent med list.                   Brand Name Dispense Instructions for use Diagnosis    levothyroxine 100 MCG tablet    SYNTHROID/LEVOTHROID    90 tablet    Take 1 tablet (100 mcg) by mouth daily    Hypothyroidism, unspecified type

## 2018-03-09 NOTE — ASSESSMENT & PLAN NOTE
Improved symptoms on levothyroxine . She increased dose to 100mcg and feels good on it   Her dysmenorrhea has improved  Continue 100mcg   Recheck in 3 months

## 2018-03-09 NOTE — NURSING NOTE
"Chief Complaint   Patient presents with     Infection     Panel Management     tdap       Initial /74 (BP Location: Right arm, Patient Position: Chair, Cuff Size: Adult Regular)  Pulse 117  Temp 98  F (36.7  C) (Oral)  Resp 16  Ht 5' 2\" (1.575 m)  Wt 118 lb (53.5 kg)  SpO2 100%  BMI 21.58 kg/m2 Estimated body mass index is 21.58 kg/(m^2) as calculated from the following:    Height as of this encounter: 5' 2\" (1.575 m).    Weight as of this encounter: 118 lb (53.5 kg).  Medication Reconciliation: complete   Rachelle Fleming CMA (AAMA)      "

## 2018-04-04 ENCOUNTER — OFFICE VISIT (OUTPATIENT)
Dept: URGENT CARE | Facility: RETAIL CLINIC | Age: 33
End: 2018-04-04
Payer: MEDICAID

## 2018-04-04 VITALS — TEMPERATURE: 98.1 F | SYSTOLIC BLOOD PRESSURE: 113 MMHG | DIASTOLIC BLOOD PRESSURE: 79 MMHG | HEART RATE: 102 BPM

## 2018-04-04 DIAGNOSIS — R30.0 DYSURIA: Primary | ICD-10-CM

## 2018-04-04 LAB
APPEARANCE UR: ABNORMAL
BILIRUB UR QL: ABNORMAL
COLOR UR: YELLOW
GLUCOSE URINE: ABNORMAL MG/DL
HGB UR QL: ABNORMAL
KETONES UR QL: ABNORMAL MG/DL
LEUKOCYTE ESTERASE URINE: ABNORMAL
NITRITE UR QL STRIP: ABNORMAL
PH UR STRIP: 7.5 PH (ref 5–7)
PROTEIN ALBUMIN URINE: ABNORMAL MG/DL
SOURCE: ABNORMAL
SP GR UR STRIP: 1.02 (ref 1–1.03)
UROBILINOGEN UR QL STRIP: 0.2 EU/DL (ref 0.2–1)

## 2018-04-04 PROCEDURE — 87086 URINE CULTURE/COLONY COUNT: CPT | Performed by: PHYSICIAN ASSISTANT

## 2018-04-04 PROCEDURE — 81002 URINALYSIS NONAUTO W/O SCOPE: CPT | Mod: QW | Performed by: PHYSICIAN ASSISTANT

## 2018-04-04 PROCEDURE — 87088 URINE BACTERIA CULTURE: CPT | Performed by: PHYSICIAN ASSISTANT

## 2018-04-04 PROCEDURE — 87186 SC STD MICRODIL/AGAR DIL: CPT | Performed by: PHYSICIAN ASSISTANT

## 2018-04-04 PROCEDURE — 99213 OFFICE O/P EST LOW 20 MIN: CPT | Performed by: PHYSICIAN ASSISTANT

## 2018-04-04 RX ORDER — NITROFURANTOIN 25; 75 MG/1; MG/1
100 CAPSULE ORAL 2 TIMES DAILY
Qty: 10 CAPSULE | Refills: 0 | Status: SHIPPED | OUTPATIENT
Start: 2018-04-04 | End: 2018-04-09

## 2018-04-04 NOTE — PATIENT INSTRUCTIONS
Take full course of antibiotics.  Urine culture pending, we will call you only if culture shows resistance and change of antibiotic is required or if no growth to stop antibiotics and to follow-up with your primary care provider  May use over the counter Azo or Urostat for urinary burning, but do not use before future urine tests.  Limit caffeine and alcohol as these are bladder irritants.  Drink plenty of fluids.  May take tylenol or ibuprofen as needed for discomfort.   If you develop any vomiting, high fevers or severe back pain, these can be signs of a kidney infection and you should be seen in urgent care or in the ER.  If your symptoms continue after finishing antibiotics or begin again shortly after finishing the antibiotics, please see your primary care provider or go to urgent care. Further testing may be required that is not available at [facility].  Please follow up with primary care provider if not improving, worsening or new symptoms or for any adverse reactions to medications.

## 2018-04-04 NOTE — NURSING NOTE
"Chief Complaint   Patient presents with     Dysuria     x 2 days, urination frequency, burning mostly after urnination, no fevers       Initial /79 (BP Location: Left arm)  Pulse 102  Temp 98.1  F (36.7  C) (Oral)  LMP 03/14/2018 (Within Days)  Breastfeeding? No Estimated body mass index is 21.58 kg/(m^2) as calculated from the following:    Height as of 3/9/18: 5' 2\" (1.575 m).    Weight as of 3/9/18: 118 lb (53.5 kg).  Medication Reconciliation: complete    "

## 2018-04-04 NOTE — PROGRESS NOTES
Chief Complaint   Patient presents with     Dysuria     x 2 days, urination frequency, burning mostly after urnination, no fevers        SUBJECTIVE:  Shyla Barrett is a 32 year old female who  presents today for a possible UTI. Symptoms of dysuria and frequency have been going on for 2day(s).  Hematuria no.  gradual onset and still present and mild.  There is no history of fever, chills, nausea or vomiting.  No history of vaginal  discharge. This patient does have a history of urinary tract infections. Patient denies long duration, flank pain and temperature > 101 degrees F.  LMP approx 3/14/18    Past Medical History:   Diagnosis Date     Back pain      Current Outpatient Prescriptions   Medication Sig Dispense Refill     nitroFURantoin, macrocrystal-monohydrate, (MACROBID) 100 MG capsule Take 1 capsule (100 mg) by mouth 2 times daily for 5 days 10 capsule 0     levothyroxine (SYNTHROID/LEVOTHROID) 100 MCG tablet Take 1 tablet (100 mcg) by mouth daily 90 tablet 0        Allergies   Allergen Reactions     Bactrim [Sulfamethoxazole W-Trimethoprim] Rash        Social History   Substance Use Topics     Smoking status: Current Some Day Smoker     Packs/day: 0.50     Years: 2.00     Smokeless tobacco: Never Used     Alcohol use Yes      Comment: 2 drinks/month       ROS:   CONSTITUTIONAL:NEGATIVE for fever, chills  GI: NEGATIVE for nausea, abdominal pain, vomiting  : POSITIVE dysuria and frequency     OBJECTIVE:  /79 (BP Location: Left arm)  Pulse 102  Temp 98.1  F (36.7  C) (Oral)  LMP 03/14/2018 (Within Days)  Breastfeeding? No  GENERAL APPEARANCE: healthy, alert and no distress  RESP: lungs clear to auscultation - no rales, rhonchi or wheezes  CV: regular rates and rhythm, normal S1 S2, no murmur noted  ABDOMEN:  soft, nontender, bowel sounds normal  BACK: No CVA tenderness  SKIN: no suspicious lesions or rashes    Urine dip - slightly cloudy, small leuks, neg nitrites, trace blood  Urine culture  pending    ASSESSMENT:   (R30.0) Dysuria  (primary encounter diagnosis)    PLAN:  nitroFURantoin, macrocrystal-monohydrate, (MACROBID) 100 MG capsule  Take full course of antibiotics.  Urine culture pending, we will call you only if culture shows resistance and change of antibiotic is required or if no growth to stop antibiotics and to follow-up with your primary care provider  May use over the counter Azo or Urostat for urinary burning, but do not use before future urine tests.  Limit caffeine and alcohol as these are bladder irritants.  Drink plenty of fluids.  May take tylenol or ibuprofen as needed for discomfort.   If you develop any vomiting, high fevers or severe back pain, these can be signs of a kidney infection and you should be seen in urgent care or in the ER.  If your symptoms continue after finishing antibiotics or begin again shortly after finishing the antibiotics, please see your primary care provider or go to urgent care. Further testing may be required that is not available at [facility].  Please follow up with primary care provider if not improving, worsening or new symptoms or for any adverse reactions to medications.       Rochelle Kirk PA-C  [facility]

## 2018-04-04 NOTE — MR AVS SNAPSHOT
After Visit Summary   4/4/2018    Shyla Barrett    MRN: [id number]           Patient Information     Date Of Birth          1985        Visit Information        Provider Department      4/4/2018 12:00 PM Rochelle Kirk PA-C [facility]        Today's Diagnoses     Dysuria    -  1      Care Instructions    Take full course of antibiotics.  Urine culture pending, we will call you only if culture shows resistance and change of antibiotic is required or if no growth to stop antibiotics and to follow-up with your primary care provider  May use over the counter Azo or Urostat for urinary burning, but do not use before future urine tests.  Limit caffeine and alcohol as these are bladder irritants.  Drink plenty of fluids.  May take tylenol or ibuprofen as needed for discomfort.   If you develop any vomiting, high fevers or severe back pain, these can be signs of a kidney infection and you should be seen in urgent care or in the ER.  If your symptoms continue after finishing antibiotics or begin again shortly after finishing the antibiotics, please see your primary care provider or go to urgent care. Further testing may be required that is not available at [facility].  Please follow up with primary care provider if not improving, worsening or new symptoms or for any adverse reactions to medications.             Follow-ups after your visit        Who to contact     You can reach your care team any time of the day by calling [phone].  Notification of test results:  If you have an abnormal lab result, we will notify you by phone as soon as possible.         Additional Information About Your Visit        MyChart Information     [employer] gives you secure access to your electronic health record. If you see a primary care provider, you can also send messages to your care team and make appointments. If you have questions, please call your primary care clinic.  If you do not have a  Refill authorizaton sent to provider for Aconex     [email or web address]  Clinical Pharmacist  [facility]  Phone: [phone]       primary care provider, please call [phone] and they will assist you.        Care EveryWhere ID     This is your Care EveryWhere ID. This could be used by other organizations to access your [facility] medical records  [id number]        Your Vitals Were     Pulse Temperature Last Period Breastfeeding?          102 98.1  F (36.7  C) (Oral) 03/14/2018 (Within Days) No         Blood Pressure from Last 3 Encounters:   04/04/18 113/79   03/09/18 108/74   01/29/18 104/66    Weight from Last 3 Encounters:   03/09/18 118 lb (53.5 kg)   01/29/18 117 lb (53.1 kg)   11/22/17 118 lb (53.5 kg)              We Performed the Following     HCL U/A, W/O MICRO, NON AUTO     Urine Culture Aerobic Bacterial          Today's Medication Changes          These changes are accurate as of 4/4/18 12:18 PM.  If you have any questions, ask your nurse or doctor.               Start taking these medicines.        Dose/Directions    nitroFURantoin (macrocrystal-monohydrate) 100 MG capsule   Commonly known as:  MACROBID   Used for:  Dysuria        Dose:  100 mg   Take 1 capsule (100 mg) by mouth 2 times daily for 5 days   Quantity:  10 capsule   Refills:  0            Where to get your medicines      These medications were sent to [facility] - [place], [place] - [address]  [address] [address]     Phone:  [phone]     nitroFURantoin (macrocrystal-monohydrate) 100 MG capsule                Primary Care Provider Office Phone # Fax #    Lluvia Cardona [phone]       [address] [place] [address]        Equal Access to Services     [facility] : Hadgucci Moy, franci heller, alf bourne. So [facility] [phone].    ATENCIÓN: Si habla español, tiene a weinstein disposición servicios gratuitos de asistencia lingüística. Llame al [phone].    We comply with applicable federal civil rights laws and [place]  laws. We do not discriminate on the basis of race, color, national origin, age, disability, sex, sexual orientation, or gender identity.            Thank you!     Thank you for choosing [facility]  for your care. Our goal is always to provide you with excellent care. Hearing back from our patients is one way we can continue to improve our services. Please take a few minutes to complete the written survey that you may receive in the mail after your visit with us. Thank you!             Your Updated Medication List - Protect others around you: Learn how to safely use, store and throw away your medicines at www.disposemymeds.org.          This list is accurate as of 4/4/18 12:18 PM.  Always use your most recent med list.                   Brand Name Dispense Instructions for use Diagnosis    levothyroxine 100 MCG tablet    SYNTHROID/LEVOTHROID    90 tablet    Take 1 tablet (100 mcg) by mouth daily    Hypothyroidism, unspecified type       nitroFURantoin (macrocrystal-monohydrate) 100 MG capsule    MACROBID    10 capsule    Take 1 capsule (100 mg) by mouth 2 times daily for 5 days    Dysuria

## 2018-04-06 LAB
BACTERIA SPEC CULT: ABNORMAL
Lab: ABNORMAL
SPECIMEN SOURCE: ABNORMAL

## 2018-04-06 NOTE — PROGRESS NOTES
UC prelim 10,000 to 50,000 colonies/mL Lactose fermenting gram negative rods  On Macrobid   Await final susceptibility results.  Rochelle Kirk PA-C  [facility]

## 2018-04-06 NOTE — PROGRESS NOTES
final result 10,000 to 50,000 colonies/mL Escherichia coli susceptible to current Macrobid course  No change in plan.  Rochelle Kirk PA-C  [facility]

## 2018-06-22 ENCOUNTER — TELEPHONE (OUTPATIENT)
Dept: OBGYN | Facility: OTHER | Age: 33
End: 2018-06-22

## 2018-06-22 ENCOUNTER — OFFICE VISIT (OUTPATIENT)
Dept: OBGYN | Facility: OTHER | Age: 33
End: 2018-06-22
Payer: COMMERCIAL

## 2018-06-22 VITALS
TEMPERATURE: 99 F | RESPIRATION RATE: 12 BRPM | BODY MASS INDEX: 22.13 KG/M2 | DIASTOLIC BLOOD PRESSURE: 68 MMHG | WEIGHT: 121 LBS | SYSTOLIC BLOOD PRESSURE: 110 MMHG | HEART RATE: 88 BPM

## 2018-06-22 DIAGNOSIS — R39.9 UTI SYMPTOMS: Primary | ICD-10-CM

## 2018-06-22 DIAGNOSIS — E03.9 HYPOTHYROIDISM, UNSPECIFIED TYPE: ICD-10-CM

## 2018-06-22 LAB
ALBUMIN UR-MCNC: 30 MG/DL
APPEARANCE UR: CLEAR
BACTERIA #/AREA URNS HPF: ABNORMAL /HPF
BILIRUB UR QL STRIP: NEGATIVE
COLOR UR AUTO: YELLOW
GLUCOSE UR STRIP-MCNC: NEGATIVE MG/DL
HGB UR QL STRIP: ABNORMAL
HYALINE CASTS #/AREA URNS LPF: ABNORMAL /LPF
KETONES UR STRIP-MCNC: NEGATIVE MG/DL
LEUKOCYTE ESTERASE UR QL STRIP: ABNORMAL
MUCOUS THREADS #/AREA URNS LPF: PRESENT /LPF
NITRATE UR QL: NEGATIVE
NON-SQ EPI CELLS #/AREA URNS LPF: ABNORMAL /LPF
PH UR STRIP: 6.5 PH (ref 5–7)
RBC #/AREA URNS AUTO: ABNORMAL /HPF
SOURCE: ABNORMAL
SP GR UR STRIP: 1.02 (ref 1–1.03)
UROBILINOGEN UR STRIP-ACNC: 0.2 EU/DL (ref 0.2–1)
WBC #/AREA URNS AUTO: ABNORMAL /HPF

## 2018-06-22 PROCEDURE — 99213 OFFICE O/P EST LOW 20 MIN: CPT | Performed by: OBSTETRICS & GYNECOLOGY

## 2018-06-22 PROCEDURE — 81001 URINALYSIS AUTO W/SCOPE: CPT | Performed by: OBSTETRICS & GYNECOLOGY

## 2018-06-22 RX ORDER — LEVOTHYROXINE SODIUM 100 UG/1
100 TABLET ORAL DAILY
Qty: 90 TABLET | Refills: 3 | Status: SHIPPED | OUTPATIENT
Start: 2018-06-22

## 2018-06-22 RX ORDER — NITROFURANTOIN 25; 75 MG/1; MG/1
100 CAPSULE ORAL 2 TIMES DAILY
Qty: 14 CAPSULE | Refills: 0 | Status: SHIPPED | OUTPATIENT
Start: 2018-06-22 | End: 2018-09-20

## 2018-06-22 ASSESSMENT — PAIN SCALES - GENERAL: PAINLEVEL: NO PAIN (0)

## 2018-06-22 NOTE — TELEPHONE ENCOUNTER
Per Dr Gray, pt needs to do lab only for UA recheck 3 days after finishing antibiotic. Due 7/2/18. Please call pt to help schedule lab only to get this done. Order placed.    Flaca Bro CMA ([facility])

## 2018-06-22 NOTE — PROGRESS NOTES
SUBJECTIVE:   Shyla Barrett is a 32 year old female who presents to clinic today for the following health issues:      HPI Patient is a bhavesh 32 year old who believes she is starting to have a uti based on pain when starting to urinate.     URINARY TRACT SYMPTOMS  Onset: about 2 days ago     Description:   Painful urination (Dysuria): YES  Blood in urine (Hematuria): no   Delay in urine (Hesitency): no     Intensity: mild    Progression of Symptoms:  same    Accompanying Signs & Symptoms:  Fever/chills: no   Flank pain no   Nausea and vomiting: no   Any vaginal symptoms: none  Abdominal/Pelvic Pain: no     History:   History of frequent UTI's: YES  History of kidney stones: no   Sexually Active: YES  Possibility of pregnancy: No    Precipitating factors:   Nothing     Therapies Tried and outcome: nothing     Problem list and histories reviewed & adjusted, as indicated.  Additional history: as documented      Patient Active Problem List   Diagnosis     Low back pain     Ankle pain     Headache     Dysmenorrhea     Acquired hypothyroidism     Screen for STD (sexually transmitted disease)     Past Surgical History:   Procedure Laterality Date     ABDOMEN SURGERY       BREAST SURGERY       C/SECTION, CLASSICAL      x 2     COSMETIC SURGERY         Social History   Substance Use Topics     Smoking status: Current Some Day Smoker     Packs/day: 0.50     Years: 2.00     Smokeless tobacco: Never Used     Alcohol use Yes      Comment: 2 drinks/month     Family History   Problem Relation Age of Onset     Cancer - colorectal Maternal Grandfather          Current Outpatient Prescriptions   Medication Sig Dispense Refill     levothyroxine (SYNTHROID/LEVOTHROID) 100 MCG tablet Take 1 tablet (100 mcg) by mouth daily 90 tablet 3     nitroFURantoin, macrocrystal-monohydrate, (MACROBID) 100 MG capsule Take 1 capsule (100 mg) by mouth 2 times daily 14 capsule 0     [DISCONTINUED] levothyroxine (SYNTHROID/LEVOTHROID) 100 MCG tablet  Take 1 tablet (100 mcg) by mouth daily 90 tablet 0     Allergies   Allergen Reactions     Bactrim [Sulfamethoxazole W-Trimethoprim] Rash     BP Readings from Last 3 Encounters:   06/22/18 110/68   04/04/18 113/79   03/09/18 108/74    Wt Readings from Last 3 Encounters:   06/22/18 121 lb (54.9 kg)   03/09/18 118 lb (53.5 kg)   01/29/18 117 lb (53.1 kg)                    ROS:  CONSTITUTIONAL: NEGATIVE for fever, chills, change in weight  ENT/MOUTH: NEGATIVE for ear, mouth and throat problems  RESP: NEGATIVE for significant cough or SOB  CV: NEGATIVE for chest pain, palpitations or peripheral edema  ROS otherwise negative    OBJECTIVE:     /68  Pulse 88  Temp 99  F (37.2  C) (Temporal)  Resp 12  Wt 121 lb (54.9 kg)  LMP 05/15/2018  BMI 22.13 kg/m2  Body mass index is 22.13 kg/(m^2).  Well appearing female in NAD  Remainder of exam deferred    Diagnostic Test Results:  Results for orders placed or performed in visit on 06/22/18 (from the past 24 hour(s))   UA reflex to Microscopic and Culture   Result Value Ref Range    Color Urine Yellow     Appearance Urine Clear     Glucose Urine Negative NEG^Negative mg/dL    Bilirubin Urine Negative NEG^Negative    Ketones Urine Negative NEG^Negative mg/dL    Specific Gravity Urine 1.025 1.003 - 1.035    Blood Urine Trace (A) NEG^Negative    pH Urine 6.5 5.0 - 7.0 pH    Protein Albumin Urine 30 (A) NEG^Negative mg/dL    Urobilinogen Urine 0.2 0.2 - 1.0 EU/dL    Nitrite Urine Negative NEG^Negative    Leukocyte Esterase Urine Small (A) NEG^Negative    Source Midstream Urine    Urine Microscopic   Result Value Ref Range    WBC Urine 0 - 5 OTO5^0 - 5 /HPF    RBC Urine O - 2 OTO2^O - 2 /HPF    Hyaline Casts O - 2 OTO2^O - 2 /LPF    Squamous Epithelial /LPF Urine Few FEW^Few /LPF    Bacteria Urine Few (A) NEG^Negative /HPF    Mucous Urine Present (A) NEG^Negative /LPF       ASSESSMENT/PLAN:     32 y.o. With early uti symptoms and UA positive for blood, protein, leuk's. Will  treat with macrobid. Also refilled her thyroid medication per her request. Recommend sixto and increased water hydration. RTC for sixto.    Prema Gray MD  [facility]

## 2018-06-22 NOTE — MR AVS SNAPSHOT
After Visit Summary   6/22/2018    Shyla Barrett    MRN: [id number]           Patient Information     Date Of Birth          1985        Visit Information        Provider Department      6/22/2018 9:30 AM Prema Gray MD [facility]        Today's Diagnoses     UTI symptoms    -  1    Hypothyroidism, unspecified type           Follow-ups after your visit        Follow-up notes from your care team     Return if symptoms worsen or fail to improve.      Who to contact     If you have questions or need follow up information about today's clinic visit or your schedule please contact [facility] directly at [phone].  Normal or non-critical lab and imaging results will be communicated to you by MyChart, letter or phone within 4 business days after the clinic has received the results. If you do not hear from us within 7 days, please contact the clinic through [employer] or phone. If you have a critical or abnormal lab result, we will notify you by phone as soon as possible.  Submit refill requests through [employer] or call your pharmacy and they will forward the refill request to us. Please allow 3 business days for your refill to be completed.          Additional Information About Your Visit        MyChart Information     [employer] gives you secure access to your electronic health record. If you see a primary care provider, you can also send messages to your care team and make appointments. If you have questions, please call your primary care clinic.  If you do not have a primary care provider, please call [phone] and they will assist you.        Care EveryWhere ID     This is your Care EveryWhere ID. This could be used by other organizations to access your [facility] medical records  [id number]        Your Vitals Were     Pulse Temperature Respirations Last Period BMI (Body Mass Index)       88 99  F (37.2  C) (Temporal) 12 05/15/2018 22.13 kg/m2        Blood  Pressure from Last 3 Encounters:   06/22/18 110/68   04/04/18 113/79   03/09/18 108/74    Weight from Last 3 Encounters:   06/22/18 121 lb (54.9 kg)   03/09/18 118 lb (53.5 kg)   01/29/18 117 lb (53.1 kg)              We Performed the Following     UA reflex to Microscopic and Culture     Urine Microscopic          Today's Medication Changes          These changes are accurate as of 6/22/18 10:04 AM.  If you have any questions, ask your nurse or doctor.               Start taking these medicines.        Dose/Directions    nitroFURantoin (macrocrystal-monohydrate) 100 MG capsule   Commonly known as:  MACROBID   Used for:  UTI symptoms   Started by:  Prema Gray MD        Dose:  100 mg   Take 1 capsule (100 mg) by mouth 2 times daily   Quantity:  14 capsule   Refills:  0            Where to get your medicines      These medications were sent to [facility] [place]  [place] - [address]   [address] [place] [address]     Phone:  [phone]     levothyroxine 100 MCG tablet    nitroFURantoin (macrocrystal-monohydrate) 100 MG capsule                Primary Care Provider Office Phone # Fax #    Lluvia Cardona [phone]       [address] [place] [address]  [facility] [address]        Equal Access to Services     [facility] : Hadii aad ku hadasho Soomaali, waaxda luqadaha, qaybta kaalmada adeegyada, alf darnell. So [facility] [phone].    ATENCIÓN: Si habla español, tiene a weinstein disposición servicios gratuitos de asistencia lingüística. Zaynab al [phone].    We comply with applicable federal civil rights laws and [place] laws. We do not discriminate on the basis of race, color, national origin, age, disability, sex, sexual orientation, or gender identity.            Thank you!     Thank you for choosing [facility]  for your care. Our goal is always to provide you with excellent care. Hearing back from our patients is one  way we can continue to improve our services. Please take a few minutes to complete the written survey that you may receive in the mail after your visit with us. Thank you!             Your Updated Medication List - Protect others around you: Learn how to safely use, store and throw away your medicines at www.disposemymeds.org.          This list is accurate as of 6/22/18 10:04 AM.  Always use your most recent med list.                   Brand Name Dispense Instructions for use Diagnosis    levothyroxine 100 MCG tablet    SYNTHROID/LEVOTHROID    90 tablet    Take 1 tablet (100 mcg) by mouth daily    Hypothyroidism, unspecified type       nitroFURantoin (macrocrystal-monohydrate) 100 MG capsule    MACROBID    14 capsule    Take 1 capsule (100 mg) by mouth 2 times daily    UTI symptoms

## 2018-06-28 NOTE — TELEPHONE ENCOUNTER
Left message for pt to return call, when call is returned give information below and help schedule lab only for UA. Order placed.    Flaca Bro CMA ([facility])

## 2018-06-29 NOTE — TELEPHONE ENCOUNTER
Huddled with Dr Coyne and stated this was ok for her to wait until she returns. Left message for pt to return call, when call is returned give information above and help schedule lab only for when she returns.    Flaca Bro CMA ([facility])

## 2018-07-02 NOTE — TELEPHONE ENCOUNTER
Left message for pt to return call, when call is returned give information below and help schedule lab only.      Flaca Bro CMA ([facility])

## 2018-09-20 ENCOUNTER — VIRTUAL VISIT (OUTPATIENT)
Dept: FAMILY MEDICINE | Facility: OTHER | Age: 33
End: 2018-09-20
Payer: COMMERCIAL

## 2018-09-20 DIAGNOSIS — N30.00 ACUTE CYSTITIS WITHOUT HEMATURIA: Primary | ICD-10-CM

## 2018-09-20 DIAGNOSIS — R30.0 DYSURIA: ICD-10-CM

## 2018-09-20 LAB
ALBUMIN UR-MCNC: NEGATIVE MG/DL
APPEARANCE UR: CLEAR
BILIRUB UR QL STRIP: NEGATIVE
COLOR UR AUTO: YELLOW
GLUCOSE UR STRIP-MCNC: NEGATIVE MG/DL
HGB UR QL STRIP: ABNORMAL
KETONES UR STRIP-MCNC: NEGATIVE MG/DL
LEUKOCYTE ESTERASE UR QL STRIP: ABNORMAL
NITRATE UR QL: NEGATIVE
NON-SQ EPI CELLS #/AREA URNS LPF: ABNORMAL /LPF
PH UR STRIP: 7 PH (ref 5–7)
RBC #/AREA URNS AUTO: ABNORMAL /HPF
SOURCE: ABNORMAL
SP GR UR STRIP: 1.01 (ref 1–1.03)
SPECIMEN SOURCE: NORMAL
UROBILINOGEN UR STRIP-ACNC: 0.2 EU/DL (ref 0.2–1)
WBC #/AREA URNS AUTO: ABNORMAL /HPF
WET PREP SPEC: NORMAL

## 2018-09-20 PROCEDURE — 87210 SMEAR WET MOUNT SALINE/INK: CPT | Performed by: FAMILY MEDICINE

## 2018-09-20 PROCEDURE — 81001 URINALYSIS AUTO W/SCOPE: CPT | Performed by: FAMILY MEDICINE

## 2018-09-20 PROCEDURE — 99441 ZZC PHYSICIAN TELEPHONE EVALUATION 5-10 MIN: CPT | Performed by: FAMILY MEDICINE

## 2018-09-20 RX ORDER — CIPROFLOXACIN 500 MG/1
500 TABLET, FILM COATED ORAL 2 TIMES DAILY
Qty: 6 TABLET | Refills: 0 | Status: SHIPPED | OUTPATIENT
Start: 2018-09-20

## 2018-09-20 ASSESSMENT — PAIN SCALES - GENERAL: PAINLEVEL: MILD PAIN (3)

## 2018-09-20 NOTE — MR AVS SNAPSHOT
After Visit Summary   9/20/2018    Shyla Barrett    MRN: [id number]           Patient Information     Date Of Birth          1985        Visit Information        Provider Department      9/20/2018 12:15 PM Luis Eduardo Johnson MD [facility]        Today's Diagnoses     Acute cystitis without hematuria    -  1    Dysuria           Follow-ups after your visit        Who to contact     If you have questions or need follow up information about today's clinic visit or your schedule please contact [facility] directly at [phone].  Normal or non-critical lab and imaging results will be communicated to you by [employer], letter or phone within 4 business days after the clinic has received the results. If you do not hear from us within 7 days, please contact the clinic through [employer] or phone. If you have a critical or abnormal lab result, we will notify you by phone as soon as possible.  Submit refill requests through [employer] or call your pharmacy and they will forward the refill request to us. Please allow 3 business days for your refill to be completed.          Additional Information About Your Visit        MyChart Information     [employer] gives you secure access to your electronic health record. If you see a primary care provider, you can also send messages to your care team and make appointments. If you have questions, please call your primary care clinic.  If you do not have a primary care provider, please call [phone] and they will assist you.        Care EveryWhere ID     This is your Care EveryWhere ID. This could be used by other organizations to access your [facility] medical records  [id number]         Blood Pressure from Last 3 Encounters:   06/22/18 110/68   04/04/18 113/79   03/09/18 108/74    Weight from Last 3 Encounters:   06/22/18 121 lb (54.9 kg)   03/09/18 118 lb (53.5 kg)   01/29/18 117 lb (53.1 kg)              We Performed the Following     UA  reflex to Microscopic and Culture     Urine Microscopic     Wet prep          Today's Medication Changes          These changes are accurate as of 9/20/18 12:51 PM.  If you have any questions, ask your nurse or doctor.               Start taking these medicines.        Dose/Directions    ciprofloxacin 500 MG tablet   Commonly known as:  CIPRO   Used for:  Acute cystitis without hematuria   Started by:  Luis Eduardo Johnson MD        Dose:  500 mg   Take 1 tablet (500 mg) by mouth 2 times daily   Quantity:  6 tablet   Refills:  0            Where to get your medicines      These medications were sent to [facility] [place] - [address]   [address] [place] [address]     Phone:  [phone]     ciprofloxacin 500 MG tablet                Primary Care Provider Office Phone # Fax #    Lluvia Cardona [phone]       [address] [place] [address]        Equal Access to Services     [facility]: Hadii saman torrez hadasho Soomaali, waaxda luqadaha, qaybta kaalmada adeegyada, alf ibarra haynida plummer . So [facility] [phone].    ATENCIÓN: Si habla español, tiene a weinstein disposición servicios gratuitos de asistencia lingüística. Zaynab al [phone].    We comply with applicable federal civil rights laws and [place] laws. We do not discriminate on the basis of race, color, national origin, age, disability, sex, sexual orientation, or gender identity.            Thank you!     Thank you for choosing [facility]  for your care. Our goal is always to provide you with excellent care. Hearing back from our patients is one way we can continue to improve our services. Please take a few minutes to complete the written survey that you may receive in the mail after your visit with us. Thank you!             Your Updated Medication List - Protect others around you: Learn how to safely use, store and throw away your medicines at  www.disposemymeds.org.          This list is accurate as of 9/20/18 12:51 PM.  Always use your most recent med list.                   Brand Name Dispense Instructions for use Diagnosis    ciprofloxacin 500 MG tablet    CIPRO    6 tablet    Take 1 tablet (500 mg) by mouth 2 times daily    Acute cystitis without hematuria       levothyroxine 100 MCG tablet    SYNTHROID/LEVOTHROID    90 tablet    Take 1 tablet (100 mcg) by mouth daily    Hypothyroidism, unspecified type

## 2018-09-20 NOTE — PROGRESS NOTES
Shyla Barrett is a 33 year old female who is being evaluated via a billable telephone visit.        Consent has been obtained for this service by care team member: yes. See the scanned image in the medical record.  SUBJECTIVE:     Shyla Barrett complains of  No chief complaint on file.      I have reviewed and updated the patient's Past Medical History, Social History, Family History and Medication List.    ALLERGIES  Bactrim [sulfamethoxazole w-trimethoprim]    Raji Quiroz CMA   (MA signature)    Additional provider notes: UTI - Female  Duration of complaint: 4 days     Description:   Painful urination (Dysuria): YES- at end of urination  Blood in urine (Hematuria): no  Delay in urine (Hesitency): no  Intensity: moderate  Progression of Symptoms:  improving  Accompanying Signs & Symptoms:  Fever/chills: no  Flank pain no  Nausea and vomiting: no  Any vaginal symptoms: none  Abdominal/Pelvic Pain: no  History:   History of frequent UTI's: YES  History of kidney stones: no  Sexually Active: YES  Possibility of pregnancy: No  Precipitating factors:   Therapies Tried and outcome: none          OBJECTIVE:       ICD-10-CM    1. Acute cystitis without hematuria N30.00 ciprofloxacin (CIPRO) 500 MG tablet   2. Dysuria R30.0 Wet prep     UA reflex to Microscopic and Culture     Urine Microscopic       UA suspicious for UTI.  Will treat with antibiotics.  Instructed to finish the entire course of antibiotics.     I have reviewed the note as documented above.  This accurately captures the substance of my conversation with the patient,  Shyla Barrett     Total time of call between patient and provider was 5 minutes     Luis Eduardo Johnson MD

## 2018-12-26 ENCOUNTER — OFFICE VISIT (OUTPATIENT)
Dept: FAMILY MEDICINE | Facility: OTHER | Age: 33
End: 2018-12-26
Payer: COMMERCIAL

## 2018-12-26 VITALS
HEIGHT: 62 IN | SYSTOLIC BLOOD PRESSURE: 114 MMHG | BODY MASS INDEX: 24.66 KG/M2 | OXYGEN SATURATION: 98 % | RESPIRATION RATE: 16 BRPM | DIASTOLIC BLOOD PRESSURE: 62 MMHG | WEIGHT: 134 LBS | TEMPERATURE: 99.1 F | HEART RATE: 96 BPM

## 2018-12-26 DIAGNOSIS — J06.9 VIRAL URI WITH COUGH: Primary | ICD-10-CM

## 2018-12-26 LAB
DEPRECATED S PYO AG THROAT QL EIA: NORMAL
SPECIMEN SOURCE: NORMAL

## 2018-12-26 PROCEDURE — 99213 OFFICE O/P EST LOW 20 MIN: CPT | Performed by: NURSE PRACTITIONER

## 2018-12-26 PROCEDURE — 87880 STREP A ASSAY W/OPTIC: CPT | Performed by: NURSE PRACTITIONER

## 2018-12-26 PROCEDURE — 87081 CULTURE SCREEN ONLY: CPT | Performed by: NURSE PRACTITIONER

## 2018-12-26 ASSESSMENT — MIFFLIN-ST. JEOR: SCORE: 1276.07

## 2018-12-26 ASSESSMENT — PAIN SCALES - GENERAL: PAINLEVEL: MILD PAIN (3)

## 2018-12-26 NOTE — PATIENT INSTRUCTIONS
For symptoms can try the following:   For body aches, headache, pain:   Tylenol (acetaminophen) up to 1000mg 3x/day.   Ibuprofen up to 800mg 3x/day     For sore throat  1. Throat lozenges  2. Cold beverages, ice pops, OR warm liquids (teas, etc)  3. Warm salt water gargles   4. Chloroseptic spray  5. Tylenol -or- ibuprofen for pain    For cough:  1. Sleep upright  2. Humidifier- warm showers  3. Honey  4.Vicks Vapor Rub  5. Mucinex tablets (guaifenesin)  6. Robitussin (guaifenesin) or Delsym (dextromethorphan) cough syrup as directed     For nasal congestion:  Can try neti pot or simply saline  Nasal spray containing oxymetazoline (Afrin) - 1 sprays each nostril twice a day for 3 days only (prolonged use can worsen congestion symptoms once discontinued)      Should be re-evaluated for new fevers, shortness of breath or difficulty breathing, new or worsening symptoms.

## 2018-12-26 NOTE — PROGRESS NOTES
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  There is strep in her  classroom.  No one at home is sick.  Cough is dry- is mucousy in morning.    Problem list and histories reviewed & adjusted, as indicated.  Additional history: as documented    Patient Active Problem List   Diagnosis     Low back pain     Ankle pain     Headache     Dysmenorrhea     Acquired hypothyroidism     Screen for STD (sexually transmitted disease)     Past Surgical History:   Procedure Laterality Date     ABDOMEN SURGERY       BREAST SURGERY       C/SECTION, CLASSICAL      x 2     COSMETIC SURGERY         Social History     Tobacco Use     Smoking status: Current Some Day Smoker     Packs/day: 0.50     Years: 2.00     Pack years: 1.00     Smokeless tobacco: Never Used   Substance Use Topics     Alcohol use: Yes     Comment: 2 drinks/month     Family History   Problem Relation Age of Onset     Cancer - colorectal Maternal Grandfather          Current Outpatient Medications   Medication Sig Dispense Refill     levothyroxine (SYNTHROID/LEVOTHROID) 100 MCG tablet Take 1 tablet (100 mcg) by mouth daily 90 tablet 3     ciprofloxacin (CIPRO) 500 MG tablet Take 1 tablet (500 mg) by mouth 2 times daily (Patient not taking: Reported on 12/26/2018) 6 tablet 0     Allergies   Allergen Reactions     Bactrim [Sulfamethoxazole W-Trimethoprim] Rash  "      ROS:  Constitutional, HEENT, cardiovascular, pulmonary, gi and gu systems are negative, except as otherwise noted.    OBJECTIVE:     /62   Pulse 96   Temp 99.1  F (37.3  C) (Temporal)   Resp 16   Ht 1.575 m (5' 2\")   Wt 60.8 kg (134 lb)   SpO2 98%   BMI 24.51 kg/m    Body mass index is 24.51 kg/m .  GENERAL: healthy, alert and no distress  EYES: Eyes grossly normal to inspection, PERRL and conjunctivae and sclerae normal  HENT: normal cephalic/atraumatic, ear canals and TM's normal, nose and mouth without ulcers or lesions, oropharynx clear, oral mucous membranes moist, tonsillar hypertrophy and tonsillar erythema  NECK: no adenopathy, no asymmetry, masses, or scars and thyroid normal to palpation  RESP: lungs clear to auscultation - no rales, rhonchi or wheezes  CV: regular rate and rhythm, normal S1 S2, no S3 or S4, no murmur, click or rub, no peripheral edema and peripheral pulses strong  ABDOMEN: soft, nontender, no hepatosplenomegaly, no masses and bowel sounds normal  MS: no gross musculoskeletal defects noted, no edema    Diagnostic Test Results:  No results found for this or any previous visit (from the past 24 hour(s)).    ASSESSMENT/PLAN:       1. Viral URI with cough  Strep is negative.  Treat with supportive cares.  Follow up in clinic if no improvement, worsening symptoms, or concerns.    - Rapid strep screen  - Beta strep group A culture    Tabatha Hayward NP  [facility]  "

## 2018-12-27 LAB
BACTERIA SPEC CULT: NORMAL
SPECIMEN SOURCE: NORMAL

## 2019-03-06 ENCOUNTER — MYC REFILL (OUTPATIENT)
Dept: OBGYN | Facility: CLINIC | Age: 34
End: 2019-03-06

## 2019-03-06 DIAGNOSIS — E03.9 HYPOTHYROIDISM, UNSPECIFIED TYPE: ICD-10-CM

## 2019-03-06 RX ORDER — LEVOTHYROXINE SODIUM 100 UG/1
100 TABLET ORAL DAILY
Qty: 90 TABLET | Refills: 3 | Status: CANCELLED | OUTPATIENT
Start: 2019-03-06

## 2019-03-06 NOTE — TELEPHONE ENCOUNTER
Refill not appropriate.  Rx sent to the requesting pharmacy on 6/22/18 for a 3 month supply with an additional 3 refills.    Makeda Verma RN

## 2019-07-15 ENCOUNTER — TRANSFERRED RECORDS (OUTPATIENT)
Dept: HEALTH INFORMATION MANAGEMENT | Facility: CLINIC | Age: 34
End: 2019-07-15

## 2019-07-15 LAB
HPV ABSTRACT: NORMAL
PAP-ABSTRACT: NORMAL

## 2019-12-11 ENCOUNTER — TELEPHONE (OUTPATIENT)
Dept: FAMILY MEDICINE | Facility: OTHER | Age: 34
End: 2019-12-11

## 2019-12-11 NOTE — TELEPHONE ENCOUNTER
Please abstract the following data from this visit with this patient into the appropriate field in Epic:    Tests that can be patient reported without a hard copy:    Pap smear done on this date: 07/15/2019 (approximately), by this group: [facility], results in care everywhere.     Other Tests found in the patient's chart through Chart Review/Care Everywhere:        Note to [employer]straction: If this section is blank, no results were found via Chart Review/Care Everywhere.        Panel Management Review      Patient has the following on her problem list: None      Composite cancer screening  Chart review shows that this patient is due/due soon for the following None  Summary:    Patient is due/failing the following:   PAP and PHYSICAL    Action needed:   Patient needs office visit for Physical and PAP .    Type of outreach:    none per care everywhere UTD    Questions for provider review:    None                                                                                                                                    Zoie Gilmore CMA     Chart routed to Care Team .

## 2020-03-02 ENCOUNTER — HEALTH MAINTENANCE LETTER (OUTPATIENT)
Age: 35
End: 2020-03-02

## 2020-08-09 ENCOUNTER — HOSPITAL ENCOUNTER (EMERGENCY)
Facility: CLINIC | Age: 35
Discharge: HOME | End: 2020-08-09
Attending: NURSE PRACTITIONER | Admitting: NURSE PRACTITIONER
Payer: COMMERCIAL

## 2020-08-09 VITALS
DIASTOLIC BLOOD PRESSURE: 77 MMHG | BODY MASS INDEX: 24 KG/M2 | TEMPERATURE: 98 F | RESPIRATION RATE: 16 BRPM | SYSTOLIC BLOOD PRESSURE: 117 MMHG | WEIGHT: 131.2 LBS | OXYGEN SATURATION: 99 %

## 2020-08-09 DIAGNOSIS — T14.8XXA BRUISING: ICD-10-CM

## 2020-08-09 PROCEDURE — 99282 EMERGENCY DEPT VISIT SF MDM: CPT | Performed by: NURSE PRACTITIONER

## 2020-08-09 PROCEDURE — 99282 EMERGENCY DEPT VISIT SF MDM: CPT | Mod: Z6 | Performed by: NURSE PRACTITIONER

## 2020-08-09 NOTE — ED TRIAGE NOTES
Noticed today that her right foot is ecchymotic and swollen. States did injure her leg about a week ago, other than that no known injury.   Patient's airway, breathing, circulation, and disability/mental status (ABCDs) intact/WDL during triage.

## 2020-08-09 NOTE — ED PROVIDER NOTES
History     Chief Complaint   Patient presents with     Foot Pain     HPI  Shyla Barrett is a 35 year old female who presents to the ED today with c/o bruising to right foot.  Patient concerned as she had no trauma here.  She did however sustain an injury to her right calf a few days ago, this has mild bruising and is not causing any issues for patient.  Patient denies any pain, nothing makes her symptoms better or worse.    Allergies:  Allergies   Allergen Reactions     Bactrim [Sulfamethoxazole W-Trimethoprim] Rash       Problem List:    Patient Active Problem List    Diagnosis Date Noted     Dysmenorrhea 01/29/2018     Priority: Medium     Acquired hypothyroidism 01/29/2018     Priority: Medium     Screen for STD (sexually transmitted disease) 01/29/2018     Priority: Medium     Low back pain 02/17/2012     Priority: Medium     Ankle pain 02/17/2012     Priority: Medium     Headache 02/17/2012     Priority: Medium     Problem list name updated by automated process. Provider to review          Past Medical History:    Past Medical History:   Diagnosis Date     Back pain        Past Surgical History:    Past Surgical History:   Procedure Laterality Date     ABDOMEN SURGERY       BREAST SURGERY       C/SECTION, CLASSICAL      x 2     COSMETIC SURGERY         Family History:    Family History   Problem Relation Age of Onset     Cancer - colorectal Maternal Grandfather        Social History:  Marital Status:  Legally  [3]  Social History     Tobacco Use     Smoking status: Current Some Day Smoker     Packs/day: 0.50     Years: 2.00     Pack years: 1.00     Smokeless tobacco: Never Used   Substance Use Topics     Alcohol use: Yes     Comment: 2 drinks/month     Drug use: No        Medications:    ciprofloxacin (CIPRO) 500 MG tablet  levothyroxine (SYNTHROID/LEVOTHROID) 100 MCG tablet          Review of Systems   All other systems reviewed and are negative.      Physical Exam   BP: 117/77  Heart Rate:  102  Temp: 98  F (36.7  C)  Resp: 16  Weight: 59.5 kg (131 lb 3.2 oz)  SpO2: 99 %      Physical Exam  Constitutional:       Appearance: Normal appearance.   HENT:      Head: Normocephalic.      Mouth/Throat:      Mouth: Mucous membranes are moist.   Eyes:      Extraocular Movements: Extraocular movements intact.   Neck:      Musculoskeletal: Normal range of motion.   Cardiovascular:      Rate and Rhythm: Normal rate.   Pulmonary:      Effort: Pulmonary effort is normal.   Musculoskeletal: Normal range of motion.   Skin:     General: Skin is warm.      Capillary Refill: Capillary refill takes less than 2 seconds.      Findings: Bruising (Right lower extremity, lateral aspect with dependent bruising to lateral portion of right foot) present.      Comments: Distal pulses, cap refill intact, no tenderness to palpation.   Neurological:      General: No focal deficit present.      Mental Status: She is alert.   Psychiatric:         Mood and Affect: Mood normal.         ED Course        Procedures    No results found for this or any previous visit (from the past 24 hour(s)).    Medications - No data to display    Assessments & Plan (with Medical Decision Making)  Dependent bruising to right foot from prior lower extremity injury.  Patient reassured that this is very common during the healing process, she was given Ace wrap for compression, and elevate and ice as warranted at home.  Patient denies any history of bleeding disorders.  She can follow-up with primary care as needed, reasons to return to the emergency room discussed.  Patient agreeable to plan of care and discharged in stable condition.     I have reviewed the nursing notes.    I have reviewed the findings, diagnosis, plan and need for follow up with the patient.    New Prescriptions    No medications on file       Final diagnoses:   Bruising       8/9/2020   [facility] EMERGENCY DEPARTMENT     Cher Foss, GIOVANI CNP  08/09/20 1248

## 2020-08-09 NOTE — ED AVS SNAPSHOT
[facility] Emergency Department  [address] [place] [address]  Phone:  [phone]  Fax:  [phone]                                    Shyla Barrett   MRN: [id number]    Department:  [facility] Emergency Department   Date of Visit:  8/9/2020           After Visit Summary Signature Page    I have received my discharge instructions, and my questions have been answered. I have discussed any challenges I see with this plan with the nurse or doctor.    ..........................................................................................................................................  Patient/Patient Representative Signature      ..........................................................................................................................................  Patient Representative Print Name and Relationship to Patient    ..................................................               ................................................  Date                                   Time    ..........................................................................................................................................  Reviewed by Signature/Title    ...................................................              ..............................................  Date                                               Time          22EPIC Rev 08/18

## 2020-12-14 ENCOUNTER — HEALTH MAINTENANCE LETTER (OUTPATIENT)
Age: 35
End: 2020-12-14

## 2021-04-18 ENCOUNTER — HEALTH MAINTENANCE LETTER (OUTPATIENT)
Age: 36
End: 2021-04-18

## 2021-10-02 ENCOUNTER — HEALTH MAINTENANCE LETTER (OUTPATIENT)
Age: 36
End: 2021-10-02

## 2021-12-29 ENCOUNTER — APPOINTMENT (OUTPATIENT)
Dept: CT IMAGING | Facility: CLINIC | Age: 36
End: 2021-12-29
Attending: EMERGENCY MEDICINE
Payer: COMMERCIAL

## 2021-12-29 ENCOUNTER — HOSPITAL ENCOUNTER (EMERGENCY)
Facility: CLINIC | Age: 36
Discharge: HOME | End: 2021-12-29
Attending: EMERGENCY MEDICINE | Admitting: EMERGENCY MEDICINE
Payer: COMMERCIAL

## 2021-12-29 VITALS
DIASTOLIC BLOOD PRESSURE: 76 MMHG | BODY MASS INDEX: 24.03 KG/M2 | TEMPERATURE: 98.1 F | OXYGEN SATURATION: 99 % | SYSTOLIC BLOOD PRESSURE: 115 MMHG | WEIGHT: 131.38 LBS | HEART RATE: 72 BPM | RESPIRATION RATE: 16 BRPM

## 2021-12-29 DIAGNOSIS — S09.90XA CLOSED HEAD INJURY, INITIAL ENCOUNTER: ICD-10-CM

## 2021-12-29 DIAGNOSIS — S16.1XXA STRAIN OF NECK MUSCLE, INITIAL ENCOUNTER: ICD-10-CM
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
## 2021-12-29 PROCEDURE — 99284 EMERGENCY DEPT VISIT MOD MDM: CPT | Mod: 25

## 2021-12-29 PROCEDURE — 72125 CT NECK SPINE W/O DYE: CPT

## 2021-12-29 PROCEDURE — 70450 CT HEAD/BRAIN W/O DYE: CPT

## 2021-12-29 RX ORDER — CYCLOBENZAPRINE HCL 10 MG
10 TABLET ORAL 3 TIMES DAILY PRN
Qty: 20 TABLET | Refills: 0 | Status: SHIPPED | OUTPATIENT
Start: 2021-12-29 | End: 2022-01-05

## 2021-12-29 NOTE — ED PROVIDER NOTES
History     Chief Complaint   Patient presents with     Head Injury     HPI  Shyla Barrett is a 36 year old female who presents with complaints of headache and neck pain after she fell yesterday. Patient states she was walking on the ice and unfortunately fell backwards hitting the back of her head. She was unable to fall as she was carrying heavy objects in both arms. Now complains of moderate headache. Denies change in vision or hearing. States the pain went all the way to her jaw. Denies dental injury. No problems with swallowing. Denies chest or abdominal pain. No nausea or vomiting. No focal motor or sensory changes. She is able ambulate today. No treatment for her symptoms this morning. Does have a previous history of skull fracture where she slipped on the ice and hit the back of her head about 20 years ago. She was hospitalized at that time.    Allergies:  Allergies   Allergen Reactions     Bactrim [Sulfamethoxazole W-Trimethoprim] Rash       Problem List:    Patient Active Problem List    Diagnosis Date Noted     Dysmenorrhea 01/29/2018     Priority: Medium     Acquired hypothyroidism 01/29/2018     Priority: Medium     Screen for STD (sexually transmitted disease) 01/29/2018     Priority: Medium     Low back pain 02/17/2012     Priority: Medium     Ankle pain 02/17/2012     Priority: Medium     Headache 02/17/2012     Priority: Medium     Problem list name updated by automated process. Provider to review          Past Medical History:    Past Medical History:   Diagnosis Date     Back pain        Past Surgical History:    Past Surgical History:   Procedure Laterality Date     ABDOMEN SURGERY       BREAST SURGERY       C/SECTION, CLASSICAL      x 2     COSMETIC SURGERY         Family History:    Family History   Problem Relation Age of Onset     Cancer - colorectal Maternal Grandfather        Social History:  Marital Status:  Legally  [3]  Social History     Tobacco Use     Smoking status: Current  Some Day Smoker     Packs/day: 0.50     Years: 2.00     Pack years: 1.00     Smokeless tobacco: Never Used   Substance Use Topics     Alcohol use: Yes     Comment: 2 drinks/month     Drug use: No        Medications:    cyclobenzaprine (FLEXERIL) 10 MG tablet  ciprofloxacin (CIPRO) 500 MG tablet  levothyroxine (SYNTHROID/LEVOTHROID) 100 MCG tablet          Review of Systems all other systems are reviewed and are negative    Physical Exam   BP: 115/76  Pulse: 72  Temp: 98.1  F (36.7  C)  Resp: 16  Weight: 59.6 kg (131 lb 6 oz)  SpO2: 99 %      Physical Exam alert cooperative female in mild to moderate stress.  HEENT reveals no scalp contusion or localized tenderness.  No crepitus.  No hemotympanum or mansfield signs.  Pupils are equal and reactive to light accommodation.  Extraocular motions are intact.  No facial droop or asymmetry.  No dental trauma or malocclusion.  Full range of motion neck with diffuse muscular tenderness.  No midline crepitus or step-off.  Neurologically nonfocal.    ED Course                 Procedures              Critical Care time:  none               Results for orders placed or performed during the hospital encounter of 12/29/21 (from the past 24 hour(s))   CT Head w/o Contrast    Narrative    CT OF THE HEAD WITHOUT CONTRAST 12/29/2021 12:00 PM     COMPARISON: None.    HISTORY: Head injury. Pain.    TECHNIQUE: Axial CT images of the head from the skull base to the  vertex were acquired without IV contrast.    FINDINGS: The ventricles and basal cisterns are within normal limits  in configuration. There is no midline shift. There are no extra-axial  fluid collections. Gray-white differentiation is well maintained.     No intracranial hemorrhage, mass or recent infarct.    The visualized paranasal sinuses are well-aerated. There is no  mastoiditis. There are no fractures of the visualized bones.       Impression    IMPRESSION: Normal head CT.       Radiation dose for this scan was reduced using  automated exposure  control, adjustment of the mA and/or kV according to patient size, or  iterative reconstruction technique    HOA JOHNSON MD         SYSTEM ID:  [id number]   CT Cervical Spine w/o Contrast    Narrative    CT OF THE CERVICAL SPINE WITHOUT CONTRAST   12/29/2021 12:00 PM     COMPARISON: None.    HISTORY: Trauma. Pain.     TECHNIQUE: Axial images of the cervical spine were acquired without  intravenous contrast. Multiplanar reformations were created.        Impression    IMPRESSION: There is normal alignment of the cervical vertebrae;  however, there is reversal of normal cervical lordosis centered at the  C5 level. Vertebral body heights of the cervical spine are normal.  Craniocervical alignment is normal. There are no fractures of the  cervical spine. No spinal canal stenosis. No prevertebral soft tissue  swelling.      Radiation dose for this scan was reduced using automated exposure  control, adjustment of the mA and/or kV according to patient size, or  iterative reconstruction technique    HOA JOHNSON MD         SYSTEM ID:  [id number]       Medications - No data to display    Assessments & Plan (with Medical Decision Making)   Shyla Barrett is a 36 year old female who presents with complaints of headache and neck pain after she fell yesterday. Patient states she was walking on the ice and unfortunately fell backwards hitting the back of her head. She was unable to fall as she was carrying heavy objects in both arms. Now complains of moderate headache. Denies change in vision or hearing. States the pain went all the way to her jaw. Denies dental injury. No problems with swallowing. Denies chest or abdominal pain. No nausea or vomiting. No focal motor or sensory changes. She is able ambulate today. No treatment for her symptoms this morning. Does have a previous history of skull fracture where she slipped on the ice and hit the back of her head about 20 years ago. She was hospitalized at that  time.  On exam she did not have any scalp lesions.  No hemotympanum mansfield signs.  No facial droop.  Neck was supple with diffuse muscular tenderness.  Neurologically nonfocal.  Scans noted above.  No acute fractures.  He is given Flexeril for muscle spasm.  Ice or heat if helpful.  Impression closed head injury and things to watch for.  Reasons to return for reassessment discussed.  I have reviewed the nursing notes.    I have reviewed the findings, diagnosis, plan and need for follow up with the patient.       New Prescriptions    CYCLOBENZAPRINE (FLEXERIL) 10 MG TABLET    Take 1 tablet (10 mg) by mouth 3 times daily as needed for muscle spasms       Final diagnoses:   Fall, initial encounter   Closed head injury, initial encounter   Strain of neck muscle, initial encounter       12/29/2021   [facility] EMERGENCY DEPT     Lew Robin MD  12/29/21 0787

## 2021-12-29 NOTE — DISCHARGE INSTRUCTIONS
Ice to the head and neck as needed.  Ibuprofen for pain.  Flexeril for muscle spasm.  Follow-up as needed

## 2022-05-14 ENCOUNTER — HEALTH MAINTENANCE LETTER (OUTPATIENT)
Age: 37
End: 2022-05-14

## 2022-09-03 ENCOUNTER — HEALTH MAINTENANCE LETTER (OUTPATIENT)
Age: 37
End: 2022-09-03

## 2023-01-15 ENCOUNTER — HEALTH MAINTENANCE LETTER (OUTPATIENT)
Age: 38
End: 2023-01-15

## 2024-06-04 NOTE — TELEPHONE ENCOUNTER
See message below.  Also inform patient that her Gonorrhea and Chlamydia tests are negative.   Rachelle Fleming CMA ([facility])     Minimal

## 2024-07-06 ENCOUNTER — HEALTH MAINTENANCE LETTER (OUTPATIENT)
Age: 39
End: 2024-07-06